# Patient Record
Sex: MALE | Race: WHITE | NOT HISPANIC OR LATINO | Employment: OTHER | URBAN - METROPOLITAN AREA
[De-identification: names, ages, dates, MRNs, and addresses within clinical notes are randomized per-mention and may not be internally consistent; named-entity substitution may affect disease eponyms.]

---

## 2022-05-04 ENCOUNTER — HOSPITAL ENCOUNTER (EMERGENCY)
Facility: CLINIC | Age: 57
Discharge: HOME | End: 2022-05-04
Attending: EMERGENCY MEDICINE | Admitting: EMERGENCY MEDICINE
Payer: MEDICARE

## 2022-05-04 VITALS
SYSTOLIC BLOOD PRESSURE: 113 MMHG | RESPIRATION RATE: 16 BRPM | TEMPERATURE: 98.4 F | OXYGEN SATURATION: 96 % | HEART RATE: 73 BPM | DIASTOLIC BLOOD PRESSURE: 62 MMHG

## 2022-05-04 DIAGNOSIS — S09.92XA INJURY OF NOSE, INITIAL ENCOUNTER: ICD-10-CM

## 2022-05-04 DIAGNOSIS — S09.93XA FACIAL INJURY, INITIAL ENCOUNTER: ICD-10-CM
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
## 2022-05-04 PROCEDURE — 99282 EMERGENCY DEPT VISIT SF MDM: CPT

## 2022-05-04 ASSESSMENT — ENCOUNTER SYMPTOMS
CONSTITUTIONAL NEGATIVE: 1
SEIZURES: 0

## 2022-05-04 NOTE — ED PROVIDER NOTES
History   Chief Complaint:  Facial Injury     HPI   History is provided by the patient's sister, Evelyn.    Ad Joyner is a 56 year old male with history of Down syndrome and Alzheimer's dementia, who presents with fall with facial trauma.  Patient sister reports that patient was at his day program earlier today and was noted on the hallway and tripped and fell.  This happened around 1 PM.  There was no loss of consciousness or seizure-like activity.  Patient has not vomited.  He did have an epistaxis, which is since resolved.  Day program staff noted that his nose appeared crooked, prompting a call to his sister for him to be evaluated.  Per sister, patient is at his cognitive baseline.  He is not on any blood thinners.    Review of Systems   Unable to perform ROS: Dementia   Constitutional: Negative.    Eyes: Negative for visual disturbance.   Neurological: Negative for seizures.       Allergies:  Lactose intolerance    Medications:  Multivitamin with minerals tablet  Zocor  Zyloprim  Indocin  Synthroid  Aricept    Past Medical History:     Down's syndrome  Alzheimer's dementia  High cholesterol  Hypothyroidism  Bilateral impacted cerumen  Regular astigmatism of both eyes  Age-related cataract of both eyes  Folliculitis  Macrocytosis without anemia  Gout  Lactose intolerance  Has no teeth  Hearing loss  OCD  Obesity  Eczema  Severe intellectual disabilities  Pulmonary infiltrates   Trisomy 21  Chicken pox  Measles    Past Surgical History:    Dental exam under anesthesia  Testicle surgery     Family History:    Father: Alzheimer's  Mother: cancer, hyperlipidemia, hypertension  Sister: hyperlipidemia, stroke, TIA    Social History:  The patient presents to the ED with his sister.   The patient goes to a  facility.     Physical Exam     Patient Vitals for the past 24 hrs:   BP Temp Pulse Resp SpO2   05/04/22 1358 113/62 98.4  F (36.9  C) 73 16 96 %       Physical Exam  Vitals: Reviewed, as above.     General: Alert and cooperative, in moderate distress. Resting on bed.  Skin: Warm and well-perfused. No rashes, lesions, or erythema.   HEENT: Head: Normocephalic, no step off or crepitus or hematoma. No Raccoon eyes or Donnelly sign. No facial bone instability. Eyes: Conjunctiva pink, sclera white. EOMs grossly intact. PERRL Ears: Auricles without lesion, erythema, or edema. EACs with cerumen. TMs with no hemotympanum. Nose:  Nose with some edema on the right side and deviation of the nasal bridge. Some dried blood at entrance of left nare with no active bleeding.  Mouth and throat: Lips are moist with no chapping, lesions, or edema, Buccal mucosa is pink and moist without lesions. Tongue with no signs of bite or trauma.  Neck: Supple with no lymphadenopathy. Full ROM.   Pulmonary: Chest wall expansion symmetric with no increased work of breathing. Lungs clear to auscultation bilaterally.   Cardiovascular: Heart RRR with no murmurs, rubs, or gallops. 2+ radial and tibialis posterior pulses bilaterally. No peripheral edema.  Abdominal: Bowel sounds present and physiologic. Abdomen is soft and nontender.  Musculoskeletal: Moves all extremities spontaneously. No midline spinal tenderness.  Psych: Affect appropriate. Patient is quiet and resistant to being evaluated. Occasionally shouts.      Emergency Department Course       Emergency Department Course:         Reviewed:  I reviewed nursing notes, vitals, past medical history and Care Everywhere    Assessments/ Consults:  ED Course as of 05/04/22 1726   Wed May 04, 2022   1657 I obtained history and examined the patient as noted above.          Disposition:  The patient was discharged to home.     Impression & Plan     CMS Diagnoses: None      Medical Decision Making:  Elmer is a 56 year old male with history of Down syndrome and Alzheimer's dementia, who presents with fall with facial trauma.  Please see HPI and physical exam for full details.  Differential diagnosis  included facial bone fracture, clinically important traumatic brain injury, seizure, syncope, among others.  History and physical exam are reassuring and consistent with mechanical fall, and I am not concerned for seizure or syncope.  Patient had epistaxis, which has resolved.  No findings to indicate facial bone fracture.   There is no indication for advanced imaging of facial bones at this time, and according to [place] head CT rule, head CT is not indicated.  I reassured the patient and his sister that he can follow-up with ENT once the swelling has gone down to evaluate for need for closed open reduction.  Supportive care instructions were provided  as well as return precautions.  Sister is comfortable with this plan.  Patient is discharged home in stable condition.       Diagnosis:    ICD-10-CM    1. Fall, initial encounter  W19.XXXA    2. Injury of nose, initial encounter  S09.92XA    3. Facial injury, initial encounter  S09.93XA        Discharge Medications:  New Prescriptions    No medications on file       Scribe Disclosure:  I, Carol Blankenship, am serving as a scribe at 4:45 PM on 5/4/2022 to document services personally performed by Franchesca Gan PA-C based on my observations and the provider's statements to me.      Franchesca Gan PA-C  05/04/22 8363

## 2022-05-04 NOTE — ED PROVIDER NOTES
Emergency Department Attending Supervision Note  5/4/2022  4:47 PM      I evaluated this patient in conjunction with Franchesca Gan PA-C, please see primary note for full details      Briefly, the patient presents to the ED accompanied by sister with a chief complaint of a trip and fall and striking his face with swelling and deformity to his nose.  No loss of consciousness or other injuries.      On my exam, deviation of nasal bridge, dried blood to nares without active bleeding, no other head trauma noted, C-spine nontender, upper extremities nontender, steady gait    MDM    56-year-old male with a history of Alzheimer's and Down's who presents to the emergency department with facial trauma status post mechanical fall.  No indication for emergent imaging.  Doubt intracranial hemorrhage.  Patient's sister/guardian was counseled on expectant management for nasal fractures and follow-up with ENT as needed for reevaluation and further treatment after swelling subsides.  Patient sisters comfortable with this plan.  He was subsequently discharged in stable condition.  Doubt other significant trauma.    Diagnosis    ICD-10-CM    1. Fall, initial encounter  W19.XXXA    2. Injury of nose, initial encounter  S09.92XA    3. Facial injury, initial encounter  S09.93XA          MD Rivas Duggan Christopher E, MD  05/04/22 1719

## 2022-05-04 NOTE — ED TRIAGE NOTES
"Hx down syndrome and alzheimer's. Pt has minimial communication skills at baseline. Pt was at day  program when he fell and hit his nose. Denies LOC or vomiting. Pt had a nose bleed that is resolved. Sister concerned with pt nose being \"Crooked.\" Nose deviates to Left of face; which is new. Denies blood thinner use. Unable to assess pain or if pt can breathe through nose. ABC in tact.      Triage Assessment     Row Name 05/04/22 5814       Triage Assessment (Adult)    Airway WDL WDL       Respiratory WDL    Respiratory WDL WDL       Skin Circulation/Temperature WDL    Skin Circulation/Temperature WDL WDL       Cardiac WDL    Cardiac WDL WDL       Peripheral/Neurovascular WDL    Peripheral Neurovascular WDL WDL       Cognitive/Neuro/Behavioral WDL    Cognitive/Neuro/Behavioral WDL X  baseline     Level of Consciousness confused              "

## 2022-05-09 ENCOUNTER — DOCUMENTATION ONLY (OUTPATIENT)
Dept: OTHER | Facility: CLINIC | Age: 57
End: 2022-05-09
Payer: MEDICARE

## 2023-01-16 ENCOUNTER — HOSPITAL ENCOUNTER (OUTPATIENT)
Facility: CLINIC | Age: 58
Setting detail: OBSERVATION
Discharge: NURSING, REHAB OR LONG-TERM CARE | End: 2023-02-01
Attending: EMERGENCY MEDICINE | Admitting: STUDENT IN AN ORGANIZED HEALTH CARE EDUCATION/TRAINING PROGRAM
Payer: MEDICARE

## 2023-01-16 DIAGNOSIS — Q90.9 DOWN'S SYNDROME: Primary | ICD-10-CM

## 2023-01-16 DIAGNOSIS — M62.81 GENERALIZED MUSCLE WEAKNESS: ICD-10-CM

## 2023-01-16 DIAGNOSIS — R62.7 FAILURE TO THRIVE IN ADULT: ICD-10-CM

## 2023-01-16 LAB
ALBUMIN SERPL BCG-MCNC: 2.9 G/DL (ref 3.5–5.2)
ALP SERPL-CCNC: 86 U/L (ref 40–129)
ALT SERPL W P-5'-P-CCNC: 32 U/L (ref 10–50)
ANION GAP SERPL CALCULATED.3IONS-SCNC: 9 MMOL/L (ref 7–15)
AST SERPL W P-5'-P-CCNC: 28 U/L (ref 10–50)
BASOPHILS # BLD AUTO: 0.1 10E3/UL (ref 0–0.2)
BASOPHILS NFR BLD AUTO: 1 %
BILIRUB SERPL-MCNC: 0.6 MG/DL
BUN SERPL-MCNC: 8.9 MG/DL (ref 6–20)
CALCIUM SERPL-MCNC: 8.9 MG/DL (ref 8.6–10)
CHLORIDE SERPL-SCNC: 102 MMOL/L (ref 98–107)
CREAT SERPL-MCNC: 0.86 MG/DL (ref 0.67–1.17)
DEPRECATED HCO3 PLAS-SCNC: 28 MMOL/L (ref 22–29)
EOSINOPHIL # BLD AUTO: 0.1 10E3/UL (ref 0–0.7)
EOSINOPHIL NFR BLD AUTO: 1 %
ERYTHROCYTE [DISTWIDTH] IN BLOOD BY AUTOMATED COUNT: 15.1 % (ref 10–15)
GFR SERPL CREATININE-BSD FRML MDRD: >90 ML/MIN/1.73M2
GLUCOSE SERPL-MCNC: 81 MG/DL (ref 70–99)
HCT VFR BLD AUTO: 34.6 % (ref 40–53)
HGB BLD-MCNC: 11.4 G/DL (ref 13.3–17.7)
IMM GRANULOCYTES # BLD: 0.1 10E3/UL
IMM GRANULOCYTES NFR BLD: 1 %
LYMPHOCYTES # BLD AUTO: 2.2 10E3/UL (ref 0.8–5.3)
LYMPHOCYTES NFR BLD AUTO: 21 %
MCH RBC QN AUTO: 33.5 PG (ref 26.5–33)
MCHC RBC AUTO-ENTMCNC: 32.9 G/DL (ref 31.5–36.5)
MCV RBC AUTO: 102 FL (ref 78–100)
MONOCYTES # BLD AUTO: 1.4 10E3/UL (ref 0–1.3)
MONOCYTES NFR BLD AUTO: 13 %
NEUTROPHILS # BLD AUTO: 6.7 10E3/UL (ref 1.6–8.3)
NEUTROPHILS NFR BLD AUTO: 63 %
NRBC # BLD AUTO: 0 10E3/UL
NRBC BLD AUTO-RTO: 0 /100
PLATELET # BLD AUTO: 130 10E3/UL (ref 150–450)
POTASSIUM SERPL-SCNC: 3.8 MMOL/L (ref 3.4–5.3)
PROT SERPL-MCNC: 6.9 G/DL (ref 6.4–8.3)
RBC # BLD AUTO: 3.4 10E6/UL (ref 4.4–5.9)
SODIUM SERPL-SCNC: 139 MMOL/L (ref 136–145)
WBC # BLD AUTO: 10.4 10E3/UL (ref 4–11)

## 2023-01-16 PROCEDURE — 36415 COLL VENOUS BLD VENIPUNCTURE: CPT | Performed by: EMERGENCY MEDICINE

## 2023-01-16 PROCEDURE — 84155 ASSAY OF PROTEIN SERUM: CPT | Performed by: EMERGENCY MEDICINE

## 2023-01-16 PROCEDURE — 99285 EMERGENCY DEPT VISIT HI MDM: CPT | Mod: 25

## 2023-01-16 PROCEDURE — 82947 ASSAY GLUCOSE BLOOD QUANT: CPT | Performed by: EMERGENCY MEDICINE

## 2023-01-16 PROCEDURE — G0378 HOSPITAL OBSERVATION PER HR: HCPCS

## 2023-01-16 PROCEDURE — 80053 COMPREHEN METABOLIC PANEL: CPT | Performed by: EMERGENCY MEDICINE

## 2023-01-16 PROCEDURE — 99222 1ST HOSP IP/OBS MODERATE 55: CPT | Performed by: PHYSICIAN ASSISTANT

## 2023-01-16 PROCEDURE — 85025 COMPLETE CBC W/AUTO DIFF WBC: CPT | Performed by: EMERGENCY MEDICINE

## 2023-01-16 RX ORDER — ACETAMINOPHEN 650 MG/1
650 SUPPOSITORY RECTAL EVERY 6 HOURS PRN
Status: DISCONTINUED | OUTPATIENT
Start: 2023-01-16 | End: 2023-02-01 | Stop reason: HOSPADM

## 2023-01-16 RX ORDER — ONDANSETRON 2 MG/ML
4 INJECTION INTRAMUSCULAR; INTRAVENOUS EVERY 6 HOURS PRN
Status: DISCONTINUED | OUTPATIENT
Start: 2023-01-16 | End: 2023-02-01 | Stop reason: HOSPADM

## 2023-01-16 RX ORDER — ONDANSETRON 4 MG/1
4 TABLET, ORALLY DISINTEGRATING ORAL EVERY 6 HOURS PRN
Status: DISCONTINUED | OUTPATIENT
Start: 2023-01-16 | End: 2023-02-01 | Stop reason: HOSPADM

## 2023-01-16 RX ORDER — DONEPEZIL HYDROCHLORIDE 5 MG/1
5 TABLET, FILM COATED ORAL AT BEDTIME
COMMUNITY

## 2023-01-16 RX ORDER — LEVOTHYROXINE SODIUM 25 UG/1
25-50 TABLET ORAL DAILY
COMMUNITY

## 2023-01-16 RX ORDER — DONEPEZIL HYDROCHLORIDE 10 MG/1
20 TABLET, FILM COATED ORAL AT BEDTIME
COMMUNITY

## 2023-01-16 RX ORDER — ACETAMINOPHEN 325 MG/1
650 TABLET ORAL EVERY 6 HOURS PRN
Status: DISCONTINUED | OUTPATIENT
Start: 2023-01-16 | End: 2023-02-01 | Stop reason: HOSPADM

## 2023-01-16 ASSESSMENT — ACTIVITIES OF DAILY LIVING (ADL)
ADLS_ACUITY_SCORE: 39
ADLS_ACUITY_SCORE: 41
ADLS_ACUITY_SCORE: 35
ADLS_ACUITY_SCORE: 35
ADLS_ACUITY_SCORE: 47
ADLS_ACUITY_SCORE: 47
ADLS_ACUITY_SCORE: 39
ADLS_ACUITY_SCORE: 39

## 2023-01-16 NOTE — ED NOTES
[facility]  ED Nurse Handoff Report    Ad Joyner is a 57 year old male   ED Chief complaint: Social Work Services  . ED Diagnosis:   Final diagnoses:   Generalized muscle weakness   Failure to thrive in adult     Allergies: No Known Allergies    Code Status: Full Code  Activity level - Baseline/Home:  Total Care. Activity Level - Current:   Total Care. Lift room needed: Yes. Bariatric: No   Needed: No   Isolation: No. Infection: Not Applicable.     Vital Signs:   Vitals:    01/16/23 0825 01/16/23 0848   BP:  113/61   Pulse: 100 91   Resp: 18 20   Temp: 98.3  F (36.8  C) 98.7  F (37.1  C)   TempSrc: Temporal Axillary   SpO2: 97% 91%       Cardiac Rhythm:  ,      Pain level:    Patient confused: Yes. Patient Falls Risk: Yes.   Elimination Status: Has voided   Patient Report - Initial Complaint: social work services. Focused Assessment: pt generalized weakness, needing placement    Tests Performed: see results. Abnormal Results: see results.  Labs Ordered and Resulted from Time of ED Arrival to Time of ED Departure   COMPREHENSIVE METABOLIC PANEL - Abnormal       Result Value    Sodium 139      Potassium 3.8      Chloride 102      Carbon Dioxide (CO2) 28      Anion Gap 9      Urea Nitrogen 8.9      Creatinine 0.86      Calcium 8.9      Glucose 81      Alkaline Phosphatase 86      AST 28      ALT 32      Protein Total 6.9      Albumin 2.9 (*)     Bilirubin Total 0.6      GFR Estimate >90     CBC WITH PLATELETS AND DIFFERENTIAL - Abnormal    WBC Count 10.4      RBC Count 3.40 (*)     Hemoglobin 11.4 (*)     Hematocrit 34.6 (*)      (*)     MCH 33.5 (*)     MCHC 32.9      RDW 15.1 (*)     Platelet Count 130 (*)     % Neutrophils 63      % Lymphocytes 21      % Monocytes 13      % Eosinophils 1      % Basophils 1      % Immature Granulocytes 1      NRBCs per 100 WBC 0      Absolute Neutrophils 6.7      Absolute Lymphocytes 2.2      Absolute Monocytes 1.4 (*)     Absolute Eosinophils 0.1       Absolute Basophils 0.1      Absolute Immature Granulocytes 0.1      Absolute NRBCs 0.0     ROUTINE UA WITH MICROSCOPIC REFLEX TO CULTURE       Treatments provided: see MAR  Family Comments: family at bedside  OBS brochure/video discussed/provided to patient:  Yes  ED Medications: Medications - No data to display  Drips infusing:  No  For the majority of the shift, the patient's behavior Green. Interventions performed were n/a.    Sepsis treatment initiated: No     Patient tested for COVID 19 prior to admission: NO    ED Nurse Name/Phone Number: Ysabel Blakely RN,   12:11 PM    RECEIVING UNIT ED HANDOFF REVIEW    Above ED Nurse Handoff Report was reviewed: Yes  Reviewed by: Irene Ashley RN on January 16, 2023 at 1:29 PM

## 2023-01-16 NOTE — ED PROVIDER NOTES
History   Chief Complaint:  Social Work Services     The history is provided by a relative. The history is limited by the condition of the patient (Alzheimer's).      Ad Joyner is a 57 year old male who presents for social work services. Patient's sister reports that the patient lives at home with her and uses a wheelchair to move around. She states that the patient had Covid around 12/25/22 and was able to walk independently before. She notes that he is not independently moving around at all. She also notes that the patient's cognitive state is declining, due to his Alzheimer's, and he is not talking the same amount as baseline. No fever or cough. Sister adds that she is looking for the patient to be placed, because she is not able to take care of him anymore. They were told to come into the ED for admission to the hospital for placement. No new falls or injuries.     Independent Historian: Supplemented by sister, who is his guardian    ROS:  Review of Systems   Unable to perform ROS: Dementia     Allergies:  No Known Allergies     Medications:    Simvastatin   Levothyroxine   Indomethacin   Donepezil   Allopurinol   Memantine     Past Medical History:    Down's syndrome  Alzheimer's disease  Pneumonia   Hypothyroidism  Folliculitis   Gout  Lactose intolerance  Hyperlipidemia   OCD  Obese  Measles  Chicken pox    Past Surgical History:    Testicle surgery   Dental surgery     Family History:    Father - Alzheimer's, stroke  Mother - unspecified cancer, hyperlipidemia, hypertension    Social History:  Presents with sister  Lives with sister  PCP: No Ref-Primary, Physician     Physical Exam     Patient Vitals for the past 24 hrs:   BP Temp Temp src Pulse Resp SpO2   01/16/23 0848 113/61 98.7  F (37.1  C) Axillary 91 20 91 %   01/16/23 0825 -- 98.3  F (36.8  C) Temporal 100 18 97 %        Physical Exam  VS: Reviewed per above  HENT: Macroglossia  EYES: sclera anicteric  CV: Rate as noted, regular rhythm.    RESP: Effort normal. Breath sounds are normal bilaterally.  GI: no tenderness/rebound/guarding, not distended.  NEURO: Alert, moving all extremities  MSK: No deformity of the extremities  SKIN: Warm and dry    Emergency Department Course   Laboratory:  Labs Ordered and Resulted from Time of ED Arrival to Time of ED Departure   COMPREHENSIVE METABOLIC PANEL - Abnormal       Result Value    Sodium 139      Potassium 3.8      Chloride 102      Carbon Dioxide (CO2) 28      Anion Gap 9      Urea Nitrogen 8.9      Creatinine 0.86      Calcium 8.9      Glucose 81      Alkaline Phosphatase 86      AST 28      ALT 32      Protein Total 6.9      Albumin 2.9 (*)     Bilirubin Total 0.6      GFR Estimate >90     CBC WITH PLATELETS AND DIFFERENTIAL - Abnormal    WBC Count 10.4      RBC Count 3.40 (*)     Hemoglobin 11.4 (*)     Hematocrit 34.6 (*)      (*)     MCH 33.5 (*)     MCHC 32.9      RDW 15.1 (*)     Platelet Count 130 (*)     % Neutrophils 63      % Lymphocytes 21      % Monocytes 13      % Eosinophils 1      % Basophils 1      % Immature Granulocytes 1      NRBCs per 100 WBC 0      Absolute Neutrophils 6.7      Absolute Lymphocytes 2.2      Absolute Monocytes 1.4 (*)     Absolute Eosinophils 0.1      Absolute Basophils 0.1      Absolute Immature Granulocytes 0.1      Absolute NRBCs 0.0     ROUTINE UA WITH MICROSCOPIC REFLEX TO CULTURE      Emergency Department Course & Assessments:     Consultations/Discussion of Management or Tests:  ED Course as of 01/16/23 1148   Mon Jan 16, 2023   0930 I obtained history and examined the patient.   1142 I spoke with DIONE Worrell, accepting admission for Dr. Garcia, hospitalist.   1148 I rechecked and updated the patient and family.       Social Determinants of Health affecting care:  Lives with sister, who is his guardian    Disposition:  The patient was admitted to the hospital under the care of Dr. Garcia.     Impression & Plan    Medical Decision Making:  Patient presents  with sister for evaluation of failure to thrive, generalized weakness.  Vital signs reassuring.  History and exam limited due to underlying Down syndrome and cognitive delay.  Sister reports that she is having trouble caring for him due to progressive weakness since recent COVID illness.  Basic labs without marked derangement.  Plan for admission for further evaluation and consideration of placement to higher level of care.    Diagnosis:    ICD-10-CM    1. Generalized muscle weakness  M62.81       2. Failure to thrive in adult  R62.7            Scribe Disclosure:  I, Felicia Mcnamara, am serving as a scribe at 9:02 AM on 1/16/2023 to document services personally performed by Bhupendra Barreto MD based on my observations and the provider's statements to me.     1/16/2023   Bhupendra Barreto MD Lindenbaum, Elan, MD  01/16/23 2346

## 2023-01-16 NOTE — ED TRIAGE NOTES
ABCs intact. Pt had Covid around [place]. Pt has been weak post covid, not being able to get around. Pt's family has been using an office chair to get him around the house. Pt's family talked with social service and was told to bring him the ER for placement since they do not have equipment. Pt was seen at [facility] last week and was supposed to be placed but discharged him instead without talking to family.

## 2023-01-16 NOTE — H&P
[facility]    History and Physical - Hospitalist Service       Date of Admission:  1/16/2023    Assessment & Plan Ad Joyner is a 57 year old male with past medical history significant for Down's syndrome with cognitive impairment and memory loss, hypothyroidism, OCD, hearing loss admitted on 1/16/2023 for disposition assistance.     Legal guardians (Evelyn and Mack) have been caring for Mr. Joyner in home for the last 3 years, prior to this resided in a group home. Due to inability to care for the patient in the home, have sought assistance from the ED, hospital. Have been trying to place since end of December as an outpatient but unsuccessful so far.     Need for disposition assistance  - no acute medical issues  - consult sw, unclear benefit of PT assessment due to cognitive issues but may request for disposition planning    Down's Syndrome    Cognitive Impairment with Memory Loss  At baseline on admit - largely non verbal, used to wheelchair transfer up until the last month, requires assistance with all adl's.  Resistant to medical cares such as vital sign monitoring, lab draws, clinic visits etc.   Currently calm. Does have history of behavior disturbances described as combativeness but no issues with this for years per family.  - resume Donepezil  - as he is a social admission with no acute medical issues will not be drawing routine labs, vital sign monitoring etc. unless there is a change in status  - Bedside attendant at RN discretion  - on a regular diet that is easy to chew due to lack of dentition     Macrocytic Anemia  Hgb near baseline. Does have known hemorrhoid but no significant bleeding symptoms.  - no further work up unless bleeding symptoms       Diet:  regular    DVT Prophylaxis: Pneumatic Compression Devices  Robbins Catheter: Not present    Cardiac Monitoring: None    Code Status:   DNR DNI as per discussion with patient's stated healthcare agent,  "Evelyn.    Clinically Significant Risk Factors Present on Admission              # Hypoalbuminemia: Lowest albumin = 2.9 g/dL at 1/16/2023 10:34 AM, will monitor as appropriate                  Disposition Plan      Expected Discharge Date: 01/17/2023                The patient's care was discussed with the RN, ED team, BRANDY - Evelyn.    Kiersten Schofield PA-C  Hospitalist Service  [facility]  Securely message with [employer] (more info)  Text page via [employer] Paging/Directory     ______________________________________________________________________    Chief Complaint   \"social work services\"    Unable to obtain a history from the patient due to mental status    History of Present Illness   Ad Joyner is a 57 year old male who was out to the emergency department by his healthcare agent with concern for social work services.  Family have been trying to get the patient placed into facility and been unsuccessful. He was ill with covid the end of December 2022, not hospitalized. Did recover from infection. Since this time period patient's sister ([facility]CA) has had increased difficulty caring for him in the home.  He did bring him to [facility] where he was in the ED overflow for disposition assistance and did a work-up there that was negative.   He did discharge home with DME supplies.     In follow up with PCP concerns were raised by sister having difficulty caring for Mr. Joyner due to cooperation, lifting. He reportedly had an episode where he was on the toilet for 6 hours and family had difficulty getting him up. Family have both had surgeries this past year and have had physical limitations in caring for Elmer.   He does have a  who had been trying to help get DME such as lift device, hospital bed, wheelchair.   He has been in his usual state of health. Evelyn states that his biggest limitation to mobility is refusal and cooperation with commands. No new falls or injury. Evelyn " reports cognitive decline since the end of December.     In the ED VSS, afebrile.  Basic lab work was performed, BMP WNL. CBC with slight thrombocytopenia, macrocytic anemia with hemoglobin of 11.4.  Admission was requested from hospitalist service for assistance with disposition.     Past Medical History    Past Medical History:   Diagnosis Date     Down's syndrome      High cholesterol        Past Surgical History   No past surgical history on file.    Prior to Admission Medications   Prior to Admission Medications   Prescriptions Last Dose Informant Patient Reported? Taking?   Calcium Carb-Cholecalciferol (CALCIUM 500 +D PO)   Yes No   Sig: Take 1 tablet by mouth daily.   Cholecalciferol (VITAMIN D-3) 5000 UNITS TABS   Yes No   Sig: Take 1 tablet by mouth daily.   HYDROcodone-acetaminophen (NORCO) 5-325 MG per tablet   No No   Sig: Take 1-2 tablets by mouth every 6 hours as needed for moderate to severe pain   Halobetasol-Ammonium Lactate 0.05 & 12 % (CREAM) KIT   Yes No   Sig: Externally apply 1 Application topically as needed   MINERAL OIL   Yes No   Si drops twice a week   allopurinol (ZYLOPRIM) 100 MG tablet   Yes No   Sig: Take 2 tablets by mouth daily.   carbamide peroxide (DEBROX) 6.5 % otic solution   Yes No   Sig: Place 5 drops in ear(s) 2 times daily 1st 7 days of each month   cyanocolbalamin (VITAMIN  B-12) 500 MCG tablet   Yes No   Sig: Take 500 mcg by mouth daily.   gemfibrozil (LOPID) 600 MG tablet   Yes No   Sig: Take 1 tablet by mouth 2 times daily (before meals).   ketoconazole (NIZORAL) 2 % shampoo   No No   Sig: Apply to the affected area and wash off after 5 minutes.   multivitamins with minerals (CERTAVITE) LIQD   Yes No   Sig: Take 15 mLs by mouth three times a week   simvastatin (ZOCOR) 80 MG tablet   Yes No   Sig: Take 1 tablet by mouth daily.      Facility-Administered Medications: None        Physical Exam   Vital Signs: Temp: 98.7  F (37.1  C) Temp src: Axillary BP: 113/61 Pulse: 91    Resp: 20 SpO2: 91 % O2 Device: None (Room air)    Weight: 0 lbs 0 oz    Gen: no acute distress  Neuro: Intermittently follows commands. Largely non verbal. Sometimes answers yes/ no questions. At baseline per family.  Cardio: Rhythm regular no murmur.  Pulmonary: No abnormal work of breathing.  No expiratory wheezing.  Skin: No gross deformities.  No lower extremity edema.    Medical Decision Making       55 MINUTES SPENT BY ME on the date of service doing chart review, history, exam, documentation & further activities per the note.      Data   As above.

## 2023-01-17 PROCEDURE — G0378 HOSPITAL OBSERVATION PER HR: HCPCS

## 2023-01-17 PROCEDURE — 250N000013 HC RX MED GY IP 250 OP 250 PS 637: Performed by: PHYSICIAN ASSISTANT

## 2023-01-17 PROCEDURE — 99231 SBSQ HOSP IP/OBS SF/LOW 25: CPT | Performed by: PHYSICIAN ASSISTANT

## 2023-01-17 RX ORDER — SIMVASTATIN 40 MG
40 TABLET ORAL DAILY
Status: DISCONTINUED | OUTPATIENT
Start: 2023-01-17 | End: 2023-01-18

## 2023-01-17 RX ORDER — LEVOTHYROXINE SODIUM 25 UG/1
50 TABLET ORAL
Status: DISCONTINUED | OUTPATIENT
Start: 2023-01-18 | End: 2023-02-01 | Stop reason: HOSPADM

## 2023-01-17 RX ORDER — UREA 10 %
500 LOTION (ML) TOPICAL DAILY
Status: DISCONTINUED | OUTPATIENT
Start: 2023-01-17 | End: 2023-02-01 | Stop reason: HOSPADM

## 2023-01-17 RX ORDER — ALLOPURINOL 100 MG/1
200 TABLET ORAL DAILY
Status: DISCONTINUED | OUTPATIENT
Start: 2023-01-17 | End: 2023-02-01 | Stop reason: HOSPADM

## 2023-01-17 RX ORDER — LEVOTHYROXINE SODIUM 25 UG/1
25 TABLET ORAL
Status: DISCONTINUED | OUTPATIENT
Start: 2023-01-17 | End: 2023-02-01 | Stop reason: HOSPADM

## 2023-01-17 RX ADMIN — SIMVASTATIN 40 MG: 40 TABLET, FILM COATED ORAL at 19:38

## 2023-01-17 RX ADMIN — DONEPEZIL HYDROCHLORIDE 25 MG: 10 TABLET ORAL at 22:09

## 2023-01-17 RX ADMIN — ALLOPURINOL 200 MG: 100 TABLET ORAL at 10:52

## 2023-01-17 RX ADMIN — LEVOTHYROXINE SODIUM 25 MCG: 0.03 TABLET ORAL at 10:53

## 2023-01-17 RX ADMIN — Medication 1 TABLET: at 10:53

## 2023-01-17 ASSESSMENT — ACTIVITIES OF DAILY LIVING (ADL)
ADLS_ACUITY_SCORE: 47

## 2023-01-17 NOTE — PLAN OF CARE
PRIMARY DIAGNOSIS: PLACEMENT   OUTPATIENT/OBSERVATION GOALS TO BE MET BEFORE DISCHARGE:  ADLs back to baseline: No    Activity and level of assistance: Total care    Pain status: not showing not verbal signs of pain    Return to near baseline physical activity: No     Discharge Planner Nurse   Safe discharge environment identified: Yes  Barriers to discharge: Yes       Entered by: Irene Ashley RN 01/17/2023      Please review provider order for any additional goals.   Nurse to notify provider when observation goals have been met and patient is ready for discharge.

## 2023-01-17 NOTE — PLAN OF CARE
PRIMARY DIAGNOSIS: GENERALIZED WEAKNESS/FAILURE TO THRIVE    OUTPATIENT/OBSERVATION GOALS TO BE MET BEFORE DISCHARGE  1. Orthostatic performed: N/A    2. Tolerating PO medications: YES    3. Return to near baseline physical activity: No    4. Cleared for discharge by consultants (if involved): No    Vitals are Temp: 99.7  F (37.6  C) Temp src: Axillary BP: 95/65 Pulse: 90   Resp: 18 SpO2: 90 %.  Patient is Disorientated x4.  Unable to make needs known. Pt needs total care. Pt is a 2 person assist with Lift.  Pt is on mechanical soft diet.  Pt is not showing any nonverbal signs of being in pain.  Patient has no IV access. Family at the bedside. Will cont to monitor.    Discharge Planner Nurse   Safe discharge environment identified: No  Barriers to discharge: Yes       Entered by: Irene Ashley RN 01/16/2023      Please review provider order for any additional goals.   Nurse to notify provider when observation goals have been met and patient is ready for discharge.

## 2023-01-17 NOTE — PLAN OF CARE
"PRIMARY DIAGNOSIS: Placement  OUTPATIENT/OBSERVATION GOALS TO BE MET BEFORE DISCHARGE:  1. ADLs back to baseline: No    2. Activity and level of assistance: x2 and lift currently    3. Pain status: Pt occasionally moans, groans, and say \"owie\". Per family, this is baseline and pt is not in pain. Pt also does this when needing to use the restroom.    4. Return to near baseline physical activity: x2 and lift since COVID-19 infection in December per family.     Discharge Planner Nurse   Safe discharge environment identified: No  Barriers to discharge: Yes, placement       Entered by: Jhonny Alfredo 01/17/2023 1:59 AM     Vitals are Temp: 98  F (36.7  C) Temp src: Tympanic BP: 96/60 Pulse: 101   Resp: 16 SpO2: 92 %.  Pt is alert, nonverbal. Assist of 2 and lift to BSC. On a mechanical soft diet. Preferred food items on whiteboard in pt's room. Takes meds crushed in apple sauce. Will need assistance with feeding. Pt has no IV access, ok per provider as pt is here for placement. Family at bedside. SW/CC consulted.          Please review provider order for any additional goals.   Nurse to notify provider when observation goals have been met and patient is ready for discharge.    "

## 2023-01-17 NOTE — PHARMACY-ADMISSION MEDICATION HISTORY
Admission medication history interview status for this patient is complete. See [facility] admission navigator for allergy information, prior to admission medications and immunization status.     Medication history interview done, indicate source(s): Ap Rivas over the phone  Medication history resources (including written lists, pill bottles, clinic record):[place]  Pharmacy: [facility] in [facility]    Changes made to PTA medication list:  Added: Levothyroxine, donepezil  Changed: Simvastatin to 40mg from 80mg  Reported as Not Taking: None  Removed: Norco, ketoconazole, gemfibrozil    Actions taken by pharmacist (provider contacted, etc):None     Additional medication history information:None    Medication reconciliation/reorder completed by provider prior to medication history?  N   (Y/N)       Prior to Admission medications    Medication Sig Last Dose Taking? Auth Provider Long Term End Date   allopurinol (ZYLOPRIM) 100 MG tablet Take 2 tablets by mouth daily. 1/16/2023 at AM Yes Reported, Patient     Calcium Carb-Cholecalciferol (CALCIUM 500 +D PO) Take 1 tablet by mouth daily. 1/15/2023 at PM Yes Reported, Patient     carbamide peroxide (DEBROX) 6.5 % otic solution Place 5 drops in ear(s) 2 times daily 1st 7 days of each month 1/15/2023 at PM Yes Reported, Patient     Cholecalciferol (VITAMIN D-3) 5000 UNITS TABS Take 1 tablet by mouth 2 times daily 1/16/2023 at AM Yes Reported, Patient     cyanocolbalamin (VITAMIN  B-12) 500 MCG tablet Take 500 mcg by mouth daily. 1/16/2023 at AM Yes Reported, Patient     donepezil (ARICEPT) 10 MG tablet Take 20 mg by mouth At Bedtime Take with 5mg tablet for a total of 25mg daily 1/15/2023 at PM Yes Unknown, Entered By History     donepezil (ARICEPT) 5 MG tablet Take 5 mg by mouth At Bedtime Take with two 10mg tablets for a total of 25mg daily 1/15/2023 at PM Yes Unknown, Entered By History     Halobetasol-Ammonium Lactate 0.05 & 12 % (CREAM) KIT Externally apply 1 Application  topically as needed Past Month Yes Reported, Patient     levothyroxine (SYNTHROID/LEVOTHROID) 25 MCG tablet Take 25-50 mcg by mouth daily Take two 25mcg tablets on Saturday Sunday and Wednesday. Take one 25mcg tablet all other days 1/16/2023 at AM Yes Unknown, Entered By History Yes    multivitamins with minerals (CERTAVITE) LIQD Take 15 mLs by mouth three times a week 1/16/2023 at AM Yes Reported, Patient     simvastatin (ZOCOR) 80 MG tablet Take 40 mg by mouth daily 1/15/2023 at PM Yes Reported, Patient Yes    MINERAL OIL 4 drops twice a week   Reported, Patient

## 2023-01-17 NOTE — PLAN OF CARE
"PRIMARY DIAGNOSIS: Placement  OUTPATIENT/OBSERVATION GOALS TO BE MET BEFORE DISCHARGE:  1. ADLs back to baseline: No    2. Activity and level of assistance: x2 and lift currently    3. Pain status: Pt occasionally moans, groans, and say \"owie\". Per family, this is baseline and pt is not in pain. Pt also does this when needing to use the restroom.    4. Return to near baseline physical activity: x2 since COVID-19 infection in December per family.     Discharge Planner Nurse   Safe discharge environment identified: No  Barriers to discharge: Yes, placement       Entered by: Jhonny Alfredo 01/17/2023 5:14 AM     Vitals are Temp: 98  F (36.7  C) Temp src: Tympanic BP: 96/60 Pulse: 101   Resp: 16 SpO2: 92 %.  Pt is alert, nonverbal. Assist of 2 and lift to BSC. On a mechanical soft diet. Preferred food items on whiteboard in pt's room. Takes meds crushed in apple sauce. Will need assistance with feeding. Pt has no IV access, ok per provider as pt is here for placement. SW/CC consulted.          Please review provider order for any additional goals.   Nurse to notify provider when observation goals have been met and patient is ready for discharge.    "

## 2023-01-17 NOTE — PLAN OF CARE
"PRIMARY DIAGNOSIS: Placement  OUTPATIENT/OBSERVATION GOALS TO BE MET BEFORE DISCHARGE:  1. ADLs back to baseline: No    2. Activity and level of assistance: x2 and lift currently    3. Pain status: Pt occasionally moans, groans, and say \"owie\". Per family, this is baseline and pt is not in pain. Pt also does this when needing to use the restroom.    4. Return to near baseline physical activity: x2 and lift since COVID-19 infection in December per family.     Discharge Planner Nurse   Safe discharge environment identified: No  Barriers to discharge: Yes, placement       Entered by: Jhonny Alfredo 01/17/2023 2:10 AM     Vitals are Temp: 98  F (36.7  C) Temp src: Tympanic BP: 96/60 Pulse: 101   Resp: 16 SpO2: 92 %.  Pt is alert, nonverbal. Assist of 2 and lift to BSC. On a mechanical soft diet. Preferred food items on whiteboard in pt's room. Takes meds crushed in apple sauce. Will need assistance with feeding. Pt has no IV access, ok per provider as pt is here for placement. SW/CC consulted.          Please review provider order for any additional goals.   Nurse to notify provider when observation goals have been met and patient is ready for discharge.    "

## 2023-01-17 NOTE — PROGRESS NOTES
[facility]    Medicine Progress Note - Hospitalist Service    Date of Admission:  1/16/2023    Assessment & Plan Ad Joyner is a 57 year old male with past medical history significant for Down's syndrome with cognitive impairment and memory loss, hypothyroidism, OCD, hearing loss admitted on 1/16/2023 for disposition assistance.     Legal guardians (Evelyn and Mack) have been caring for Mr. Joyner in home for the last 3 years, prior to this resided in a group home. Due to inability to care for the patient in the home, have sought assistance from the ED, hospital. Have been trying to place since end of December as an outpatient but unsuccessful so far.     Need for disposition assistance  - no acute medical issues  - consult sw, unclear benefit of PT assessment due to cognitive issues but may request for disposition planning    Down's Syndrome    Cognitive Impairment with Memory Loss, Functional decline in status  At baseline on admit - largely non verbal, used to wheelchair transfer up until the last month, requires assistance with all adl's.  Resistant to medical cares such as vital sign monitoring, lab draws, clinic visits etc.   Currently calm. Does have history of behavior disturbances described as combativeness but no issues with this for years per family.  Cognitive, functional decline since December, 2022.   - resume Donepezil  - as he is a social admission with no acute medical issues will not be drawing routine labs, vital sign monitoring etc. unless there is a change in status  - Bedside attendant at RN discretion  - on a regular diet that is easy to chew due to lack of dentition   - did discuss goals of care with co - guardian today, Evelyn. Restorative, supportive only.     Macrocytic Anemia  Hgb near baseline. Does have known hemorrhoid but no significant bleeding symptoms.  - no further work up unless bleeding symptoms         Diet: Mechanical/Dental Soft Diet    DVT  Prophylaxis: Pneumatic Compression Devices  Robbins Catheter: Not present  Lines: None     Cardiac Monitoring: None  Code Status: No CPR- Pre-arrest intubation OK      Clinically Significant Risk Factors Present on Admission              # Hypoalbuminemia: Lowest albumin = 2.9 g/dL at 1/16/2023 10:34 AM, will monitor as appropriate                  Disposition Plan      Expected Discharge Date: 01/31/2023    Discharge Delays: Placement - LTC  *Medically Ready for Discharge            The patient's care was discussed with the RN.  Kiersten Schofield PA-C  Hospitalist Service  [facility]  Securely message with [employer] (more info)  Text page via [facility] Paging/Directory   ______________________________________________________________________    Interval History   Intermittent refusal of medications, cares. Did eat lunch. No new symptoms to report.  Evelyn is at bedside today and we did discuss goals of care.  I recommended consideration for palliative care and hospice at hospital dismissal if functional status continues to decline.  Evelyn expresses understanding regarding these recommendations.  She does state that goals for Elmer are only supportive.  We discussed that if he were to stop eating here in the hospital that we would revisit this plan as she would not want him to have a feeding tube placed or anything invasive.    Physical Exam   Vital Signs: Temp: 98  F (36.7  C) Temp src: Tympanic BP: 96/60 Pulse: 101   Resp: 16 SpO2: 92 % O2 Device: None (Room air)    Weight: 0 lbs 0 oz    General: no acute distress    Medical Decision Making       25 MINUTES SPENT BY ME on the date of service doing chart review, history, exam, documentation & further activities per the note.      Data   ------------------------- PAST 24 HR DATA REVIEWED -----------------------------------------------

## 2023-01-17 NOTE — UTILIZATION REVIEW
Concurrent stay review; Secondary Review Determination    [facility]        Under the authority of the Utilization Management Committee, the utilization review process indicated a secondary review on the above patient.  The review outcome is based on review of the medical records, discussions with staff, and applying clinical experience noted on the date of the review.        (x) Observation/outpatient Status Appropriate - Concurrent stay review- [facility]IAL       RATIONALE FOR DETERMINATION:   57 year old male with past medical history significant for Down's syndrome with cognitive impairment and memory loss, hypothyroidism, OCD, hearing loss admitted on 1/16/2023 for disposition assistance.      Legal guardians (Evelyn and Mack) have been caring for Mr. Joyner in home for the last 3 years, prior to this resided in a group home. Due to inability to care for the patient in the home, have sought assistance from the ED, hospital. Have been trying to place since end of December as an outpatient but unsuccessful so far.     Patient delayed discharge is related to disposition, there is no medical necessity for inpatient admission at the time of this review. If there is a change in patient status, please resend for review.    The information on this document is developed by the utilization review team in order for the business office to ensure compliance.  This only denotes the appropriateness of proper admission status and does not reflect the quality of care rendered.       The definitions of Inpatient Status and Observation Status used in making the determination above are those provided in the CMS Coverage Manual, Chapter 1 and Chapter 6, section 70.4.       Sincerely,    Mitch Dobbins MD

## 2023-01-17 NOTE — CONSULTS
Care Management Initial Consult    General Information  Assessment completed with: Family, Guardian Evelyn  Type of CM/SW Visit: Offer D/C Planning    Primary Care Provider verified and updated as needed: Yes   Readmission within the last 30 days:     Reason for Consult: discharge planning  Advance Care Planning: Advance Care Planning Reviewed: present on chart        Communication Assessment  Patient's communication style: spoken language (English or Bilingual)    Hearing Difficulty or Deaf: no     Cognitive  Cognitive/Neuro/Behavioral: .WDL except, all  Level of Consciousness: alert  Arousal Level: opens eyes spontaneously  Orientation: disoriented x 4  Mood/Behavior: calm  Best Language: 0 - No aphasia  Speech: JONO (unable to assess)    Living Environment:   People in home: sibling(s)     Current living Arrangements: house      Able to return to prior arrangements: yes    Family/Social Support:  Care provided by:  (guardian)  Provides care for: no one, unable/limited ability to care for self  Marital Status: Single  Guardian          Description of Support System: Supportive, Involved    Support Assessment: Lacks adequate physical care    Current Resources:   Patient receiving home care services:  No  Equipment currently used at home:  WC, hospital bed    Employment/Financial:  Employment Status: disabled     Financial Concerns: insurance, none   Referral to Financial Worker: No     Lifestyle & Psychosocial Needs:  Social Determinants of Health     Tobacco Use: Low Risk      Smoking Tobacco Use: Never     Smokeless Tobacco Use: Never     Passive Exposure: Not on file   Alcohol Use: Not on file   Financial Resource Strain: Not on file   Food Insecurity: Not on file   Transportation Needs: Not on file   Physical Activity: Not on file   Stress: Not on file   Social Connections: Not on file   Intimate Partner Violence: Not on file   Depression: Not on file   Housing Stability: Not on file     Functional  Status:  Prior to admission patient needed assistance: Patient was admitted under observation status for weakness, failure to thrive.     KELLI met with patient and guarditommy Rivas at bedside. She reports that she was caring for patient in her home for the past few years but is unable to do so any longer d/t equipment needs and patient's increasing acuity. Guardian and LINDY are working on finding group home placement for patient. Guardian has toured facilities and CM has sent referrals prior to admission.     KELLI spoke with patient's DD LINDY Linder, [phone]. She reports that she has sent referrals to [facility], [facility], and [facility]. Referrals are pending at this time. She is hopeful that they will be able to obtain  funding within 2-4 weeks.     Bessie Rivas is agreeable to LTC/SNF referrals being sent for placement from hospital prior to moving in to  as patient is medically stable and does not need a hospital level of care. She lives in [place] and would prefer [facility].     Reviewed MOON letter with guardian.     Mental Health Status:  Mental Health Status: No Current Concerns       Chemical Dependency Status:  Chemical Dependency Status: No Current Concerns           Values/Beliefs:  Spiritual, Cultural Beliefs, Mu-ism Practices, Values that affect care: yes             Additional Information:  Plan: LTC referrals sent. LINDY and guardian working on obtaining  placement for patient. KELLI will continue to follow.     LUIS M Rea, [facility]   Inpatient Care Coordination  [facility]   [phone]

## 2023-01-17 NOTE — PLAN OF CARE
PRIMARY DIAGNOSIS: PLACEMENT   OUTPATIENT/OBSERVATION GOALS TO BE MET BEFORE DISCHARGE:  1. ADLs back to baseline: No    2. Activity and level of assistance: Total care    3. Pain status: not showing not verbal signs of pain    4. Return to near baseline physical activity: No     Vitals are Temp: 98  F (36.7  C) Temp src: Tympanic BP: 96/60 Pulse: 101   Resp: 16 SpO2: 92 %.  Patient is Disorientated x4. Pt is a total care.  Pt is a mechanical soft diet.  pt is not showing any nonverbal signs of being in pain.  Patient has no IV access.Family at bedside and helping. Will cont to monitor.     Discharge Planner Nurse   Safe discharge environment identified: Yes  Barriers to discharge: Yes       Entered by: Irene Ashley RN 01/17/2023      Please review provider order for any additional goals.   Nurse to notify provider when observation goals have been met and patient is ready for discharge.

## 2023-01-18 ENCOUNTER — APPOINTMENT (OUTPATIENT)
Dept: PHYSICAL THERAPY | Facility: CLINIC | Age: 58
End: 2023-01-18
Attending: PHYSICIAN ASSISTANT
Payer: MEDICARE

## 2023-01-18 PROCEDURE — G0378 HOSPITAL OBSERVATION PER HR: HCPCS

## 2023-01-18 PROCEDURE — 97161 PT EVAL LOW COMPLEX 20 MIN: CPT | Mod: GP

## 2023-01-18 PROCEDURE — 250N000013 HC RX MED GY IP 250 OP 250 PS 637: Performed by: PHYSICIAN ASSISTANT

## 2023-01-18 PROCEDURE — 99231 SBSQ HOSP IP/OBS SF/LOW 25: CPT | Performed by: PHYSICIAN ASSISTANT

## 2023-01-18 RX ORDER — SIMVASTATIN 40 MG
40 TABLET ORAL
Status: DISCONTINUED | OUTPATIENT
Start: 2023-01-18 | End: 2023-02-01 | Stop reason: HOSPADM

## 2023-01-18 RX ADMIN — LEVOTHYROXINE SODIUM 50 MCG: 0.03 TABLET ORAL at 12:01

## 2023-01-18 RX ADMIN — CYANOCOBALAMIN TAB 500 MCG 500 MCG: 500 TAB at 12:00

## 2023-01-18 RX ADMIN — SIMVASTATIN 40 MG: 40 TABLET, FILM COATED ORAL at 18:25

## 2023-01-18 RX ADMIN — DONEPEZIL HYDROCHLORIDE 25 MG: 10 TABLET ORAL at 18:25

## 2023-01-18 RX ADMIN — ALLOPURINOL 200 MG: 100 TABLET ORAL at 11:59

## 2023-01-18 RX ADMIN — Medication 1 TABLET: at 11:59

## 2023-01-18 ASSESSMENT — ACTIVITIES OF DAILY LIVING (ADL)
ADLS_ACUITY_SCORE: 47
ADLS_ACUITY_SCORE: 51
ADLS_ACUITY_SCORE: 47

## 2023-01-18 NOTE — PLAN OF CARE
PRIMARY DIAGNOSIS: Placement  OUTPATIENT/OBSERVATION GOALS TO BE MET BEFORE DISCHARGE:  1. ADLs back to baseline: No    2. Activity and level of assistance: Ax2 and lift    3. Pain status: no pain visualized    4. Return to near baseline physical activity: No     Discharge Planner Nurse   Safe discharge environment identified: No  Barriers to discharge: Yes, placement       Entered by: Jhonny Alfredo 01/18/2023 1:18 AM     Vitals are Temp: 99.3  F (37.4  C) Temp src: Axillary BP: 97/64 Pulse: 100   Resp: 16 SpO2: 95 %.    Pt is alert, nonverbal. Ax2 and total care. On a mechanical soft diet. Awaiting placement. CC/SW following.           Please review provider order for any additional goals.   Nurse to notify provider when observation goals have been met and patient is ready for discharge.

## 2023-01-18 NOTE — PROGRESS NOTES
[facility]  OUTPATIENT PHYSICAL THERAPY EVALUATION  PLAN OF TREATMENT FOR OUTPATIENT REHABILITATION  (COMPLETE FOR INITIAL CLAIMS ONLY)  Patient's Last Name, First Name, M.I.  YOB: 1965  Ad Joyner                        Provider's Name  [facility] Medical Record No.  [id number]                             Onset Date:  01/16/23   Start of Care Date:  01/18/23   Type:     _X_PT   ___OT   ___SLP Medical Diagnosis:  down's syndrome              PT Diagnosis:  impaired functional mobility Visits from SOC:  1     See note for plan of treatment, functional goals and certification details    I CERTIFY THE NEED FOR THESE SERVICES FURNISHED UNDER        THIS PLAN OF TREATMENT AND WHILE UNDER MY CARE     (Physician co-signature of this document indicates review and certification of the therapy plan).                 01/18/23 1500   Appointment Info   Signing Clinician's Name / Credentials (PT) Ramila Storm DPT   Quick Adds   Quick Adds Certification   Living Environment   People in Home sibling(s)   Current Living Arrangements house   Home Accessibility stairs to enter home;stairs within home   Number of Stairs, Main Entrance 2   Stair Railings, Main Entrance railings safe and in good condition   Number of Stairs, Within Home, Primary eight   Stair Railings, Within Home, Primary railings safe and in good condition   Living Environment Comments Had not been able to complete stairs recently, needing stretcher transport/stair chair out of home.   Self-Care   Usual Activity Tolerance fair   Current Activity Tolerance poor   Equipment Currently Used at Home walker, rolling;wheelchair, manual;hospital bed   Fall history within last six months no   Activity/Exercise/Self-Care Comment Pt had been transferring to/from  with assist of 1, sister assists with all ADLs/IADLs. Per sister, had recently been able to walk a short distance with no AD.  "  General Information   Onset of Illness/Injury or Date of Surgery 01/16/23   Referring Physician Kiersten Schofield PA-C   Pertinent History of Current Problem (include personal factors and/or comorbidities that impact the POC) \"57 year old male with past medical history significant for Down's syndrome with cognitive impairment and memory loss, hypothyroidism, OCD, hearing loss admitted on 1/16/2023 for disposition assistance.      Legal guardians (Evelyn and Mack) have been caring for Mr. Joyner in home for the last 3 years, prior to this resided in a group home. Due to inability to care for the patient in the home, have sought assistance from the ED, hospital. Have been trying to place since end of December as an outpatient but unsuccessful so far.\"   Existing Precautions/Restrictions fall   General Observations Pt up in WC with sister present, agreeable to eval.   Cognition   Affect/Mental Status (Cognition) unable/difficult to assess   Orientation Status (Cognition) unable/difficult to assess   Follows Commands (Cognition) follows one-step commands;0-24% accuracy   Cognitive Status Comments Pt is non-verbal, does appear to follow commands at times.   Pain Assessment   Patient Currently in Pain No   Integumentary/Edema   Integumentary/Edema no deficits were identifed   Posture    Posture Forward head position;Protracted shoulders   Range of Motion (ROM)   Range of Motion ROM is WFL   Strength (Manual Muscle Testing)   Strength (Manual Muscle Testing) Deficits observed during functional mobility   Strength Comments Decreased LE functional strength   Bed Mobility   Comment, (Bed Mobility) NT, pt up in WC upon PT arrival   Transfers   Transfers sit-stand transfer   Comment, (Transfers) Initiated with FWW, pt groaning and resisting. Next attempted with HHA, pt continues to resist transfer trial   Gait/Stairs (Locomotion)   Comment, (Gait/Stairs) Unable to test d/t command following and participation in eval "   Balance   Balance other (describe)   Balance Comments Fair sitting balance in WC   Clinical Impression   Criteria for Skilled Therapeutic Intervention Yes, treatment indicated   PT Diagnosis (PT) impaired functional mobility   Influenced by the following impairments impaired strength, balance, cognition   Functional limitations due to impairments impaired bed mobility, transfers, ambulation   Clinical Presentation (PT Evaluation Complexity) Stable/Uncomplicated   Clinical Presentation Rationale Based on current presentation, PMH, social support   Clinical Decision Making (Complexity) low complexity   Planned Therapy Interventions (PT) balance training;bed mobility training;gait training;home exercise program;patient/family education;ROM (range of motion);stair training;strengthening;transfer training;home program guidelines;progressive activity/exercise   Risk & Benefits of therapy have been explained evaluation/treatment results reviewed;care plan/treatment goals reviewed;risks/benefits reviewed;current/potential barriers reviewed;participants voiced agreement with care plan;participants included;patient   PT Total Evaluation Time   PT Eval, Low Complexity Minutes (11074) 20   Therapy Certification   Start of care date 01/18/23   Certification date from 01/18/23   Certification date to 01/25/23   Medical Diagnosis down's syndrome   Physical Therapy Goals   PT Frequency 2x/week   PT Predicted Duration/Target Date for Goal Attainment 01/25/23   PT Goals Bed Mobility;Transfers;Gait   PT: Bed Mobility Minimal assist;Supine to/from sit   PT: Transfers Minimal assist;Sit to/from stand;Assistive device   PT: Gait Minimal assist;Assistive device;Rolling walker;5 feet   PT Discharge Planning   PT Plan attempt STS and bed mobility as able, sign off if does not participate   PT Discharge Recommendation (DC Rec) Long term care facility   PT Rationale for DC Rec Pt with limited participation in evaluation 2/2 cognitive  impairment. Per sister, able to transfer with Ax1 at baseline but family has been having difficulty providing appropriate level of assist. As pt does not participate in skilled therapy this date, would recommend LTC facility for increased level of assist. If returning home, would recommend use of sandrita for decreased caregiver burden.   PT Brief overview of current status poor participation in eval, refuses to stand   Total Session Time   Total Session Time (sum of timed and untimed services) 20

## 2023-01-18 NOTE — PLAN OF CARE
PRIMARY DIAGNOSIS: Placement  OUTPATIENT/OBSERVATION GOALS TO BE MET BEFORE DISCHARGE:  1. ADLs back to baseline: No    2. Activity and level of assistance: Ax2 and lift    3. Pain status: no pain visualized    4. Return to near baseline physical activity: No     Discharge Planner Nurse   Safe discharge environment identified: No  Barriers to discharge: Yes, placement       Entered by: Jhonny Alfredo 01/18/2023 1:12 AM     Vitals are Temp: 99.3  F (37.4  C) Temp src: Axillary BP: 97/64 Pulse: 100   Resp: 16 SpO2: 95 %.    Pt is alert, nonverbal. Ax2 and total care. On a mechanical soft diet. Visiting with family in room this evening. Awaiting placement. CC/SW following.           Please review provider order for any additional goals.   Nurse to notify provider when observation goals have been met and patient is ready for discharge.

## 2023-01-18 NOTE — PLAN OF CARE
"PRIMARY DIAGNOSIS: Failure to Thrive; Placement  OUTPATIENT/OBSERVATION GOALS TO BE MET BEFORE DISCHARGE:  1. ADLs back to baseline: No    Activity and level of assistance: Ax2; Lift; Total care    2. Pain status: No pain visualized    3. Return to near baseline physical activity: No     Discharge Planner Nurse   Safe discharge environment identified: No  Barriers to discharge: Yes       Entered by: María Elena Ackerman RN 01/18/2023    Pt is sleeping in bed, RR: 16. Pt is not allowing for writer to do VS or radial pulse check. Pushed hand away and says \"No\". Pt has tongue out. Is total cares; Lift. No IV Access. SW following    Please review provider order for any additional goals.   Nurse to notify provider when observation goals have been met and patient is ready for discharge.    "

## 2023-01-18 NOTE — PROGRESS NOTES
[facility]    Medicine Progress Note - Hospitalist Service    Date of Admission:  1/16/2023    Assessment & Plan Ad Joyner is a 57 year old male with past medical history significant for Down's syndrome with cognitive impairment and memory loss, hypothyroidism, OCD, hearing loss admitted on 1/16/2023 for disposition assistance.      Legal guardians (Evelyn and Mack) have been caring for Mr. Joyner in home for the last 3 years, prior to this resided in a group home. Due to inability to care for the patient in the home, have sought assistance from the ED, hospital. Have been trying to place since end of December as an outpatient but unsuccessful so far.      Need for disposition assistance  - no acute medical issues  - consult sw, unclear benefit of PT assessment due to cognitive issues but may request for disposition planning     Down's Syndrome    Cognitive Impairment with Memory Loss, Functional decline in status  At baseline on admit - largely non verbal, used to wheelchair transfer up until the last month, requires assistance with all adl's.  Resistant to medical cares such as vital sign monitoring, lab draws, clinic visits etc.   Currently calm. Does have history of behavior disturbances described as combativeness but no issues with this for years per family.  Cognitive, functional decline since December, 2022.   - resume Donepezil  - as he is a social admission with no acute medical issues will not be drawing routine labs, vital sign monitoring etc. unless there is a change in status  - Bedside attendant at RN discretion  - on a regular diet that is easy to chew due to lack of dentition, needs assistance with all meals and fluid intake  - Family is ok to continue to help with cares ie med administration, meals   - did discuss goals of care with co - guardian, Evelyn. Restorative, supportive only. I did place a hospice referral for at dismissal and Evelyn is aware of this. He  has had significant functional decline. As he is still eating, no acute medical issues, planning to hold off on formal hospice enrollment at this time.      Macrocytic Anemia  Hgb near baseline. Does have known hemorrhoid but no significant bleeding symptoms.  - no further work up unless bleeding symptoms     Diet: Mechanical/Dental Soft Diet    DVT Prophylaxis: Pneumatic Compression Devices  Robbins Catheter: Not present  Lines: None     Cardiac Monitoring: None  Code Status: No CPR- Pre-arrest intubation OK      Clinically Significant Risk Factors Present on Admission              # Hypoalbuminemia: Lowest albumin = 2.9 g/dL at 1/16/2023 10:34 AM, will monitor as appropriate                  Disposition Plan      Expected Discharge Date: 01/27/2023    Discharge Delays: Placement - LTC  *Medically Ready for Discharge            The patient's care was discussed with the RN, PT, SW.     Kiersten Schofield PA-C  Hospitalist Service  [facility]  Securely message with [employer] (more info)  Text page via [employer] Paging/Directory   ______________________________________________________________________    Interval History   Intermittent refusal of cares.  Patient's sister has been able to get him to eat, take medications.  They are requesting Preparation H for hemorrhoids.  They are he has had no nonverbal demonstrations of pain.  No vomiting.  Up in sling to wheelchair today.    Physical Exam   Vital Signs: Temp: 97.9  F (36.6  C) Temp src: Axillary BP: 97/57 Pulse: 92   Resp: 16 SpO2: 91 % O2 Device: None (Room air)    Weight: 0 lbs 0 oz  General: No apparent distress.  Appears comfortable.  Respiratory: Normal work of breathing  Neurologic: Largely nonverbal.  Alert.  Uses smiling or yes no to communicate.  Moving all extremities spontaneously.  Able to feed himself with encouragement.    Medical Decision Making       35 MINUTES SPENT BY ME on the date of service doing chart review, history, exam,  documentation & further activities per the note.      Data   ------------------------- PAST 24 HR DATA REVIEWED -----------------------------------------------

## 2023-01-18 NOTE — PLAN OF CARE
PRIMARY DIAGNOSIS: Placement  OUTPATIENT/OBSERVATION GOALS TO BE MET BEFORE DISCHARGE:  1. ADLs back to baseline: No    2. Activity and level of assistance: Ax2 and lift    3. Pain status: no pain visualized    4. Return to near baseline physical activity: No     Discharge Planner Nurse   Safe discharge environment identified: No  Barriers to discharge: Yes, placement       Entered by: Jhonny Alfredo 01/18/2023 4:55 AM     Vitals are Temp: 99.3  F (37.4  C) Temp src: Axillary BP: 97/64 Pulse: 100   Resp: 16 SpO2: 95 %.    Pt is alert, nonverbal. Ax2 and total care. Up to BSC x1. On a mechanical soft diet. Bed alarm on for safety. Awaiting placement. CC/SW following.            Please review provider order for any additional goals.   Nurse to notify provider when observation goals have been met and patient is ready for discharge.

## 2023-01-18 NOTE — PLAN OF CARE
PRIMARY DIAGNOSIS: Failure to Thrive; Placement  OUTPATIENT/OBSERVATION GOALS TO BE MET BEFORE DISCHARGE:  1. ADLs back to baseline: No    Activity and level of assistance: Ax2; Lift; Total care    2. Pain status: No pain visualized    3. Return to near baseline physical activity: No     Discharge Planner Nurse   Safe discharge environment identified: No  Barriers to discharge: Yes       Entered by: María Elena Ackerman RN 01/18/2023    Pt is awake now that family is present. Able to pull morning medication as pt's guardian is able to give medication and pt will take with applesauce. Lifted Ax2 to pt's wheelchair with chair alarm in place. Pt's wheelchair also has a seat belt. Tolerated lunch. Had a soft BM, hemorrhoid seen on rectum. PT consulted.       Please review provider order for any additional goals. Nurse to notify provider when observation goals have been met and patient is ready for discharge.

## 2023-01-19 PROCEDURE — G0378 HOSPITAL OBSERVATION PER HR: HCPCS

## 2023-01-19 PROCEDURE — 99207 PR NO CHARGE LOS: CPT | Performed by: PHYSICIAN ASSISTANT

## 2023-01-19 PROCEDURE — 250N000013 HC RX MED GY IP 250 OP 250 PS 637: Performed by: PHYSICIAN ASSISTANT

## 2023-01-19 RX ADMIN — ALLOPURINOL 200 MG: 100 TABLET ORAL at 12:12

## 2023-01-19 RX ADMIN — GLYCERIN, PETROLATUM, PHENYLEPHRINE HCL, PRAMOXINE HCL: 144; 2.5; 10; 15 CREAM TOPICAL at 20:57

## 2023-01-19 RX ADMIN — Medication 1 TABLET: at 12:13

## 2023-01-19 RX ADMIN — LEVOTHYROXINE SODIUM 25 MCG: 0.03 TABLET ORAL at 12:12

## 2023-01-19 RX ADMIN — DONEPEZIL HYDROCHLORIDE 25 MG: 10 TABLET ORAL at 18:25

## 2023-01-19 RX ADMIN — SIMVASTATIN 40 MG: 40 TABLET, FILM COATED ORAL at 18:25

## 2023-01-19 RX ADMIN — CYANOCOBALAMIN TAB 500 MCG 500 MCG: 500 TAB at 12:13

## 2023-01-19 ASSESSMENT — ACTIVITIES OF DAILY LIVING (ADL)
ADLS_ACUITY_SCORE: 44
ADLS_ACUITY_SCORE: 44
ADLS_ACUITY_SCORE: 51
ADLS_ACUITY_SCORE: 53
ADLS_ACUITY_SCORE: 44
ADLS_ACUITY_SCORE: 53
ADLS_ACUITY_SCORE: 51
ADLS_ACUITY_SCORE: 53
ADLS_ACUITY_SCORE: 44
ADLS_ACUITY_SCORE: 51

## 2023-01-19 NOTE — PLAN OF CARE
PRIMARY DIAGNOSIS: Failure to Thrive; Placement  OUTPATIENT/OBSERVATION GOALS TO BE MET BEFORE DISCHARGE:  1. ADLs back to baseline: No    Activity and level of assistance: Ax2; Lift; Total care    2. Pain status: No pain visualized    3. Return to near baseline physical activity: No     Discharge Planner Nurse   Safe discharge environment identified: No  Barriers to discharge: Yes       Entered by: María Elena Ackerman RN 01/18/2023    Pt sitting in chair with chair alarm and seat belt that is on personal wheelchair.  Lift-  Ax2; total cares. Needs feeding for food. On mechanical soft diet. Awaiting placement. SW/CM following.       Please review provider order for any additional goals. Nurse to notify provider when observation goals have been met and patient is ready for discharge.

## 2023-01-19 NOTE — PLAN OF CARE
PRIMARY DIAGNOSIS: FTT, weakness, placement.   OUTPATIENT/OBSERVATION GOALS TO BE MET BEFORE DISCHARGE:  1. ADLs back to baseline: No    2. Activity and level of assistance: Up with maximum assistance, lift & 2 assist.      3. Pain status: Pain free.    4. Return to near baseline physical activity: No     Discharge Planner Nurse   Safe discharge environment identified: No  Barriers to discharge: Yes       Entered by: Domonique Sheldon RN 01/19/2023 1:56 AM      Unable to assess orientation, pt is nonverbal, has been sleeping up until now. Pt repositioned. Medically cleared, awaiting placement.   Please review provider order for any additional goals.   Nurse to notify provider when observation goals have been met and patient is ready for discharge.

## 2023-01-19 NOTE — PLAN OF CARE
PRIMARY DIAGNOSIS: FTT, weakness, placement.   OUTPATIENT/OBSERVATION GOALS TO BE MET BEFORE DISCHARGE:  ADLs back to baseline: No    Activity and level of assistance: Up with maximum assistance, lift & 2 assist.      Pain status: Pain free.    Return to near baseline physical activity: No     Discharge Planner Nurse   Safe discharge environment identified: No  Barriers to discharge: Yes       Entered by: Domonique Sheldon RN 01/18/2023 8:06 PM   Unable to assess orientation, pt is nonverbal, does yell out often; baseline. Pt is resting in bed watching TV. Pt needing placement, SW assisting w/ discharge.   Please review provider order for any additional goals.   Nurse to notify provider when observation goals have been met and patient is ready for discharge.

## 2023-01-19 NOTE — PROGRESS NOTES
[facility]    Medicine Progress Note - Hospitalist Service    Date of Admission:  1/16/2023    Assessment & Plan   Ad Joyner is a 57 year old male with past medical history significant for Down's syndrome with cognitive impairment and memory loss, hypothyroidism, OCD, hearing loss admitted on 1/16/2023 for disposition assistance.      Legal guardians (Evelyn and Mack) have been caring for Mr. Joyner in home for the last 3 years, prior to this resided in a group home. Due to inability to care for the patient in the home, have sought assistance from the ED, hospital. Have been trying to place since end of December as an outpatient but unsuccessful so far.      #Need for disposition assistance  - no acute medical issues  - consult sw, unclear benefit of PT assessment due to cognitive issues but may request for disposition planning     #Down's Syndrome    #Cognitive Impairment with Memory Loss, Functional decline in status  At baseline on admit - largely non verbal, used to wheelchair transfer up until the last month, requires assistance with all adl's.  Resistant to medical cares such as vital sign monitoring, lab draws, clinic visits etc.   Currently calm. Does have history of behavior disturbances described as combativeness but no issues with this for years per family.  Cognitive, functional decline since December, 2022.   - resume Donepezil  - as he is a social admission with no acute medical issues will not be drawing routine labs, vital sign monitoring etc. unless there is a change in status  - Bedside attendant at RN discretion  - on a regular diet that is easy to chew due to lack of dentition, needs assistance with all meals and fluid intake  - Family is ok to continue to help with cares ie med administration, meals   - did discuss goals of care with co - guardian, Evelyn. Restorative, supportive only. I did place a hospice referral for at dismissal and Evelyn is aware of  this. He has had significant functional decline. As he is still eating, no acute medical issues, planning to hold off on formal hospice enrollment at this time.      #Macrocytic Anemia  Hgb near baseline. Does have known hemorrhoid but no significant bleeding symptoms.  - no further work up unless bleeding symptoms       Diet: Mechanical/Dental Soft Diet    DVT Prophylaxis: Pneumatic Compression Devices  Robbins Catheter: Not present  Lines: None     Cardiac Monitoring: None  Code Status: No CPR- Pre-arrest intubation OK         Disposition Plan      Expected Discharge Date: 01/27/2023    Discharge Delays: Placement - LTC  *Medically Ready for Discharge            The patient's care was discussed with the Bedside Nurse and Patient.    Ilene Mcnamara PA-C  Hospitalist Service  [facility]  Securely message with [employer] (more info)  Text page via [employer] Paging/Directory   ______________________________________________________________________    Interval History   Non verbal. Sleeping    Physical Exam   Vital Signs: Temp: 97.9  F (36.6  C) Temp src: Axillary BP: 97/57 Pulse: 92   Resp: 16 SpO2: 91 % O2 Device: None (Room air)    Weight: 0 lbs 0 oz    Alert, breathing spontaneously, appears comfortable    Medical Decision Making       10 MINUTES SPENT BY ME on the date of service doing chart review, history, exam, documentation & further activities per the note.      Data         Imaging results reviewed over the past 24 hrs:   No results found for this or any previous visit (from the past 24 hour(s)).

## 2023-01-19 NOTE — PLAN OF CARE
PRIMARY DIAGNOSIS: FTT, weakness, placement.   OUTPATIENT/OBSERVATION GOALS TO BE MET BEFORE DISCHARGE:  1. ADLs back to baseline: No    2. Activity and level of assistance: Up with maximum assistance, lift & 2 assist.      3. Pain status: Pain free.    4. Return to near baseline physical activity: No     Discharge Planner Nurse   Safe discharge environment identified: No  Barriers to discharge: Yes       Entered by: Domonique Sheldon RN 01/19/2023 5:15 AM      Unable to assess orientation, pt is nonverbal, has been sleeping most of the shift. Medically cleared, awaiting placement.   Please review provider order for any additional goals.   Nurse to notify provider when observation goals have been met and patient is ready for discharge.

## 2023-01-19 NOTE — PROGRESS NOTES
Care Management Follow Up    Expected Discharge Date: 01/27/2023     Concerns to be Addressed:  Discharge planning     Patient plan of care discussed at interdisciplinary rounds: Yes    Anticipated Discharge Disposition: Group Home vs LTC     Patient/Family in Agreement with the Plan:  Yes    Referrals Placed by CM/SW:  Skilled nursing facility  Private pay costs discussed: Not applicable    Additional Information:  LTC referrals sent, no accepting facility at this time. Post acute team is following up on pending referrals. Additionally, guardian and waiver CM pursuing GH placement at this time, no accepting facility thus far. SW will continue to follow.    LUIS M Rea, [facility]   Inpatient Care Coordination  [facility]   [phone]

## 2023-01-19 NOTE — PROGRESS NOTES
PRIMARY DIAGNOSIS: GENERALIZED WEAKNESS/FAILURE TO THRIVE/PLACEMENT    OUTPATIENT/OBSERVATION GOALS TO BE MET BEFORE DISCHARGE  1. Orthostatic performed: N/A    2. Tolerating PO medications: Yes    3. Return to near baseline physical activity: Yes    4. Cleared for discharge by consultants (if involved): No    Discharge Planner Nurse   Safe discharge environment identified: No  Barriers to discharge: Yes       Entered by: Andra Bland RN 01/19/2023 5:32 PM     Please review provider order for any additional goals.   Nurse to notify provider when observation goals have been met and patient is ready for discharge.

## 2023-01-19 NOTE — PROGRESS NOTES
PRIMARY DIAGNOSIS: GENERALIZED WEAKNESS/FAILURE TO THRIVE/PLACEMENT    OUTPATIENT/OBSERVATION GOALS TO BE MET BEFORE DISCHARGE  1. Orthostatic performed: N/A    2. Tolerating PO medications: Yes Crush med/Apple sauce    3. Return to near baseline physical activity: Yes Assist x 2 with lift device    4. Cleared for discharge by consultants (if involved): No    Discharge Planner Nurse   Safe discharge environment identified: No  Barriers to discharge: Yes       Entered by: Andra Bland RN 01/19/2023 5:36 PM     Please review provider order for any additional goals.   Nurse to notify provider when observation goals have been met and patient is ready for discharge.    Pt nonverbal. Awaiting placement. LTC vs Group home. Referral sent but no acceptance at this time. Refusing cares and repositioning at times. More cooperative when family is around. W/C bound at baseline.

## 2023-01-20 PROCEDURE — 99207 PR NO CHARGE LOS: CPT | Performed by: PHYSICIAN ASSISTANT

## 2023-01-20 PROCEDURE — G0378 HOSPITAL OBSERVATION PER HR: HCPCS

## 2023-01-20 PROCEDURE — 250N000013 HC RX MED GY IP 250 OP 250 PS 637: Performed by: PHYSICIAN ASSISTANT

## 2023-01-20 RX ADMIN — ACETAMINOPHEN 650 MG: 325 TABLET, FILM COATED ORAL at 02:32

## 2023-01-20 RX ADMIN — GLYCERIN, PETROLATUM, PHENYLEPHRINE HCL, PRAMOXINE HCL: 144; 2.5; 10; 15 CREAM TOPICAL at 21:05

## 2023-01-20 RX ADMIN — CYANOCOBALAMIN TAB 500 MCG 500 MCG: 500 TAB at 11:29

## 2023-01-20 RX ADMIN — LEVOTHYROXINE SODIUM 25 MCG: 0.03 TABLET ORAL at 11:30

## 2023-01-20 RX ADMIN — SIMVASTATIN 40 MG: 40 TABLET, FILM COATED ORAL at 17:03

## 2023-01-20 RX ADMIN — DONEPEZIL HYDROCHLORIDE 25 MG: 10 TABLET ORAL at 17:03

## 2023-01-20 RX ADMIN — GLYCERIN, PETROLATUM, PHENYLEPHRINE HCL, PRAMOXINE HCL: 144; 2.5; 10; 15 CREAM TOPICAL at 11:46

## 2023-01-20 RX ADMIN — ALLOPURINOL 200 MG: 100 TABLET ORAL at 11:29

## 2023-01-20 RX ADMIN — Medication 1 MG: at 02:32

## 2023-01-20 RX ADMIN — Medication 1 TABLET: at 11:29

## 2023-01-20 ASSESSMENT — ACTIVITIES OF DAILY LIVING (ADL)
ADLS_ACUITY_SCORE: 53

## 2023-01-20 ASSESSMENT — COLUMBIA-SUICIDE SEVERITY RATING SCALE - C-SSRS: IS THE PATIENT NOT ABLE TO COMPLETE C-SSRS: UNABLE TO VERBALIZE

## 2023-01-20 NOTE — PROGRESS NOTES
PRIMARY DIAGNOSIS: GENERALIZED WEAKNESS/FTT/PLACEMENT    OBSERVATION GOALS TO BE MET BEFORE DISCHARGE  1. Orthostatic performed: N/A    2. Tolerating PO medications: Yes, crush med/Apple sauce    3. Return to near baseline physical activity: Yes, assist x 2 with lift device    4. Cleared for discharge by consultants (if involved): Yes    Discharge Planner Nurse   Safe discharge environment identified: No  Barriers to discharge: Yes       Entered by: Fiordaliza Saba RN 01/19/2023       Pt alert, JONO orientation d/t pt nonverbal. Lung sounds CTA, bowel sounds active. No exhibits no signs of pain, no SOB noted, sats 95% on RA. Pt noted with good appetite, family member fed him dinner. Medically cleared, awaiting for placement. LTC vs Group home. Referral sent but no acceptance at this time. Pt is assist of 2 with device lift. Now in bed sleeping. Will continue to monitor.    /61 (BP Location: Left arm)   Pulse 86   Temp 97.8  F (36.6  C) (Oral)   Resp 16   SpO2 94%     Please review provider order for any additional goals.   Nurse to notify provider when observation goals have been met and patient is ready for discharge.

## 2023-01-20 NOTE — PROGRESS NOTES
PRIMARY DIAGNOSIS: GENERALIZED WEAKNESS/FTT/PLACEMENT    OBSERVATION GOALS TO BE MET BEFORE DISCHARGE  1. Orthostatic performed: N/A    2. Tolerating PO medications: Yes, crush med/Apple sauce    3. Return to near baseline physical activity: Yes, assist x 2 with lift device    4. Cleared for discharge by consultants (if involved): Yes    Discharge Planner Nurse   Safe discharge environment identified: No  Barriers to discharge: Yes       Entered by: Fiordaliza Saba RN 01/19/2023       Pt alert, JONO orientation d/t pt nonverbal. Lung sounds CTA, bowel sounds active. No exhibits no signs of pain, no SOB noted, sats 95% on RA. Pt noted with good appetite, family member fed him dinner. Medically cleared, awaiting for placement. LTC vs Group home. Referral sent but no acceptance at this time. Pt refusing cares and repositioning. Pt is assist of 2 with device lift. Will continue to monitor.    /62 (BP Location: Right arm)   Pulse 93   Temp 98  F (36.7  C) (Axillary)   Resp 16   SpO2 93%     Please review provider order for any additional goals.   Nurse to notify provider when observation goals have been met and patient is ready for discharge.

## 2023-01-20 NOTE — PROGRESS NOTES
PRIMARY DIAGNOSIS: Placement  OUTPATIENT/OBSERVATION GOALS TO BE MET BEFORE DISCHARGE:  1. ADLs back to baseline: Yes    2. Activity and level of assistance: Up with lift assistance, at baseline    3. Pain status: Improved-controlled with oral pain medications.    4. Return to near baseline physical activity: Yes     Discharge Planner Nurse   Safe discharge environment identified: No  Barriers to discharge: No       Entered by: Nilda Arias RN 01/20/2023   A/O, nonverbal at baseline. VSS on RA. Assist of 2x, lift, bedbound at baseline. Tolerating mechanical soft diet. Lung sounds clear, diminished. Bowel sounds active. Passing flatus. Incontinent of both bowels and bladder. Skin intact. Pain managed with prn tylenol. Consults: SW/CM. Plan: LTC vs GH placement.     Please review provider order for any additional goals.   Nurse to notify provider when observation goals have been met and patient is ready for discharge.

## 2023-01-20 NOTE — PROGRESS NOTES
"Care Management Follow Up    Expected Discharge Date: 01/31/2023     Concerns to be Addressed: Discharge planning      Patient plan of care discussed at interdisciplinary rounds: Yes    Anticipated Discharge Disposition: half-way    Patient/family educated on Medicare website which has current facility and service quality ratings:  Yes  Patient/Family in Agreement with the Plan:  Yes    Referrals Placed by CM/SW:  Group Home, Skilled Nursing facility  Private pay costs discussed: Not applicable    Additional Information:  SW received call from patient's guardian Evelyn [phone]. She reports that they have obtained placement for patient at a group home [facility] through [place] agency. She reports that the tentative move in date is 1/31. She reports GH manager will be calling SW today or early next week to confirm/coordinate next steps. SW will continue to follow.     LUIS M Rea, [facility]   Inpatient Care Coordination  [facility]   [phone]        " Topical Clindamycin Counseling: Patient counseled that this medication may cause skin irritation or allergic reactions.  In the event of skin irritation, the patient was advised to reduce the amount of the drug applied or use it less frequently.   The patient verbalized understanding of the proper use and possible adverse effects of clindamycin.  All of the patient's questions and concerns were addressed.

## 2023-01-20 NOTE — PROGRESS NOTES
PRIMARY DIAGNOSIS: Placement  OUTPATIENT/OBSERVATION GOALS TO BE MET BEFORE DISCHARGE:  1. ADLs back to baseline: Yes    2. Activity and level of assistance: Up with lift assistance, at baseline    3. Pain status: Improved-controlled with oral pain medications.    4. Return to near baseline physical activity: Yes     Discharge Planner Nurse   Safe discharge environment identified: No  Barriers to discharge: No       Entered by: Nilda Arias RN 01/20/2023   A/O, nonverbal at baseline. VSS on RA. Assist of 2x, lift, bedbound at baseline. Tolerating mechanical soft diet. Lung sounds clear, diminished. Bowel sounds active. Passing flatus. Incontinent of both bowels and bladder. Skin intact. Pain managed with prn tylenol. Up to BSC and WC this afternoon. Consults: SW/CM. Plan: LTC vs GH placement.      Please review provider order for any additional goals.   Nurse to notify provider when observation goals have been met and patient is ready for discharge.

## 2023-01-20 NOTE — PLAN OF CARE
PRIMARY DIAGNOSIS: GENERALIZED WEAKNESS/placement    OUTPATIENT/OBSERVATION GOALS TO BE MET BEFORE DISCHARGE  1. Orthostatic performed: No    2. Tolerating PO medications: Yes    3. Return to near baseline physical activity: Yes    4. Cleared for discharge by consultants (if involved): Yes    Discharge Planner Nurse   Safe discharge environment identified: No  Barriers to discharge: Yes       Entered by: Amanda Carrasco RN 01/20/2023 3:09 AM     Please review provider order for any additional goals.   Nurse to notify provider when observation goals have been met and patient is ready for discharge.

## 2023-01-20 NOTE — PLAN OF CARE
PRIMARY DIAGNOSIS: GENERALIZED WEAKNESS/placement    OUTPATIENT/OBSERVATION GOALS TO BE MET BEFORE DISCHARGE  1. Orthostatic performed: No    2. Tolerating PO medications: Yes crushed with apple sauce.    3. Return to near baseline physical activity: Yes    4. Cleared for discharge by consultants (if involved): Yes    Vitals are Temp: 97.8  F (36.6  C) Temp src: Oral BP: 105/61 Pulse: 86   Resp: 16 SpO2: 94 %.     Pt is alert, pt is assistX2 with lift device, pt is mechanical soft diet. Pt appears, uncomfortable and in pain given prn tylenol and it was affective. Pt sleeping now comfortabley. LTC referral send awaiting for placement.  follow up. Will con to monitor and update prn.         Discharge Planner Nurse   Safe discharge environment identified: No  Barriers to discharge: Yes       Entered by: Amanda Carrasco RN 01/20/2023 5:03 AM     Please review provider order for any additional goals.   Nurse to notify provider when observation goals have been met and patient is ready for discharge.

## 2023-01-20 NOTE — PROGRESS NOTES
[facility]    Medicine Progress Note - Hospitalist Service    Date of Admission:  1/16/2023    Assessment & Plan   Ad Joyner is a 57 year old male with past medical history significant for Down's syndrome with cognitive impairment and memory loss, hypothyroidism, OCD, hearing loss admitted on 1/16/2023 for disposition assistance.      Legal guardians (Evelyn and Mack) have been caring for Mr. Joyner in home for the last 3 years, prior to this resided in a group home. Due to inability to care for the patient in the home, have sought assistance from the ED, hospital. Have been trying to place since end of December as an outpatient but unsuccessful so far.      #Need for disposition assistance  - no acute medical issues  - consult sw, unclear benefit of PT assessment due to cognitive issues but may request for disposition planning     #Down's Syndrome    #Cognitive Impairment with Memory Loss, Functional decline in status  At baseline on admit - largely non verbal, used to wheelchair transfer up until the last month, requires assistance with all adl's.  Resistant to medical cares such as vital sign monitoring, lab draws, clinic visits etc.   Currently calm. Does have history of behavior disturbances described as combativeness but no issues with this for years per family.  Cognitive, functional decline since December, 2022.   - resume Donepezil  - as he is a social admission with no acute medical issues will not be drawing routine labs, vital sign monitoring etc. unless there is a change in status  - Bedside attendant at RN discretion  - on a regular diet that is easy to chew due to lack of dentition, needs assistance with all meals and fluid intake  - Family is ok to continue to help with cares ie med administration, meals   - did discuss goals of care with co - guardian, Evelyn. Restorative, supportive only. I did place a hospice referral for at dismissal and Evelyn is aware of  this. He has had significant functional decline. As he is still eating, no acute medical issues, planning to hold off on formal hospice enrollment at this time.      #Macrocytic Anemia  Hgb near baseline. Does have known hemorrhoid but no significant bleeding symptoms.  - no further work up unless bleeding symptoms         Diet: Mechanical/Dental Soft Diet    DVT Prophylaxis: None ordered  Robbins Catheter: Not present  Lines: None     Cardiac Monitoring: None  Code Status: No CPR- Pre-arrest intubation OK         Disposition Plan      Expected Discharge Date: 01/27/2023    Discharge Delays: Placement - LTC  *Medically Ready for Discharge            The patient's care was discussed with the Bedside Nurse and Patient.    Ilene Mcnamara PA-C  Hospitalist Service  [facility]  Securely message with [email or web address] (more info)  Text page via [employer] Paging/Directory   ______________________________________________________________________    Interval History   Stable. No new concerns      Physical Exam   Vital Signs: Temp: 97.8  F (36.6  C) Temp src: Oral BP: 105/61 Pulse: 86   Resp: 16 SpO2: 94 % O2 Device: None (Room air)    Weight: 0 lbs 0 oz    Awake, laying in bed, non verbal, does not follow commands, breathing spontaneously      Medical Decision Making       10 MINUTES SPENT BY ME on the date of service doing chart review, history, exam, documentation & further activities per the note.          Imaging results reviewed over the past 24 hrs:   No results found for this or any previous visit (from the past 24 hour(s)).

## 2023-01-21 PROCEDURE — G0378 HOSPITAL OBSERVATION PER HR: HCPCS

## 2023-01-21 PROCEDURE — 99207 PR NO CHARGE LOS: CPT | Performed by: PHYSICIAN ASSISTANT

## 2023-01-21 PROCEDURE — 250N000013 HC RX MED GY IP 250 OP 250 PS 637: Performed by: PHYSICIAN ASSISTANT

## 2023-01-21 RX ADMIN — Medication 1 MG: at 01:39

## 2023-01-21 RX ADMIN — CYANOCOBALAMIN TAB 500 MCG 500 MCG: 500 TAB at 08:34

## 2023-01-21 RX ADMIN — LEVOTHYROXINE SODIUM 50 MCG: 0.03 TABLET ORAL at 08:36

## 2023-01-21 RX ADMIN — ACETAMINOPHEN 650 MG: 325 TABLET, FILM COATED ORAL at 01:39

## 2023-01-21 RX ADMIN — ALLOPURINOL 200 MG: 100 TABLET ORAL at 08:34

## 2023-01-21 RX ADMIN — Medication 1 TABLET: at 08:34

## 2023-01-21 RX ADMIN — SIMVASTATIN 40 MG: 40 TABLET, FILM COATED ORAL at 17:10

## 2023-01-21 RX ADMIN — GLYCERIN, PETROLATUM, PHENYLEPHRINE HCL, PRAMOXINE HCL: 144; 2.5; 10; 15 CREAM TOPICAL at 15:05

## 2023-01-21 RX ADMIN — DONEPEZIL HYDROCHLORIDE 25 MG: 10 TABLET ORAL at 17:10

## 2023-01-21 RX ADMIN — GLYCERIN, PETROLATUM, PHENYLEPHRINE HCL, PRAMOXINE HCL: 144; 2.5; 10; 15 CREAM TOPICAL at 08:36

## 2023-01-21 ASSESSMENT — ACTIVITIES OF DAILY LIVING (ADL)
ADLS_ACUITY_SCORE: 53

## 2023-01-21 NOTE — PLAN OF CARE
PRIMARY DIAGNOSIS: GENERALIZED WEAKNESS/Placement     1. Orthostatic performed: N/A    2. Tolerating PO medications: Yes    3. Return to near baseline physical activity: Yes    4. Cleared for discharge by consultants (if involved): Yes     Discharge Planner Nurse   Safe discharge environment identified: No  Barriers to discharge: Yes   A & O. Patient is nonverbal at baseline. Lung sound diminished to lower lobes. No S/SX of respiratory distress. Bowel sound present all quadrants. Incontinent of B & B . A X 2 with a lift.  PRN Tylenol administered for pain. Patient on mechanical soft diet. Awaiting for GH placement. SW following .  /54 (BP Location: Right arm)   Pulse 67   Temp 98.7  F (37.1  C) (Axillary)   Resp 16   SpO2 95%        Entered by: Cedric Pierce RN 01/20/2023 04:17     Please review provider order for any additional goals.   Nurse to notify provider when observation goals have been met and patient is ready for discharge.

## 2023-01-21 NOTE — PLAN OF CARE
PRIMARY DIAGNOSIS: Placement  OUTPATIENT/OBSERVATION GOALS TO BE MET BEFORE DISCHARGE:  1. ADLs back to baseline: Yes    2. Activity and level of assistance: Up with maximum assistance.     3. Pain status: Monitored with PAINAD scale.  No pain per scale.      4. Return to near baseline physical activity: Yes     Discharge Planner Nurse   Safe discharge environment identified: Yes  Barriers to discharge: Yes       Entered by: Kassandra Guevara RN 01/21/2023 4:55 PM     Please review provider order for any additional goals.   Nurse to notify provider when observation goals have been met and patient is ready for discharge.

## 2023-01-21 NOTE — PROGRESS NOTES
PRIMARY DIAGNOSIS: Placement/ Down's Syndrome/Generalized weakness   OUTPATIENT/OBSERVATION GOALS TO BE MET BEFORE DISCHARGE:  1. ADLs back to baseline: Yes    2. Activity and level of assistance: Up with maximum assistance. Consider SW and/or PT evaluation.     3. Pain status: Pain free.    4. Return to near baseline physical activity: Yes     Discharge Planner Nurse   Safe discharge environment identified: No, placement pending.   Barriers to discharge: Yes      /54 (BP Location: Right arm)   Pulse 67   Temp 98.7  F (37.1  C) (Axillary)   Resp 16   SpO2 95%       Non-verbal, total cares. No peripheral IV. Meds crushed with applesauce.

## 2023-01-21 NOTE — PROGRESS NOTES
[facility]    Medicine Progress Note - Hospitalist Service    Date of Admission:  1/16/2023    Assessment & Plan   Ad Joyner is a 57 year old male with past medical history significant for Down's syndrome with cognitive impairment and memory loss, hypothyroidism, OCD, hearing loss admitted on 1/16/2023 for disposition assistance.      Legal guardians (Evelyn and Mack) have been caring for Mr. Joyner in home for the last 3 years, prior to this resided in a group home. Due to inability to care for the patient in the home, have sought assistance from the ED, hospital. Have been trying to place since end of December as an outpatient but unsuccessful so far.      #Need for disposition assistance  - no acute medical issues  - consult sw, unclear benefit of PT assessment due to cognitive issues but may request for disposition planning     #Down's Syndrome    #Cognitive Impairment with Memory Loss, Functional decline in status  At baseline on admit - largely non verbal, used to wheelchair transfer up until the last month, requires assistance with all adl's.  Resistant to medical cares such as vital sign monitoring, lab draws, clinic visits etc.   Currently calm. Does have history of behavior disturbances described as combativeness but no issues with this for years per family.  Cognitive, functional decline since December, 2022.   - resume Donepezil  - as he is a social admission with no acute medical issues will not be drawing routine labs, vital sign monitoring etc. unless there is a change in status  - Bedside attendant at RN discretion  - on a regular diet that is easy to chew due to lack of dentition, needs assistance with all meals and fluid intake  - Family is ok to continue to help with cares ie med administration, meals   - did discuss goals of care with co - guardian, Evelyn. Restorative, supportive only. I did place a hospice referral for at dismissal and Evelyn is aware of  this. He has had significant functional decline. As he is still eating, no acute medical issues, planning to hold off on formal hospice enrollment at this time.      #Macrocytic Anemia  Hgb near baseline. Does have known hemorrhoid but no significant bleeding symptoms.  - no further work up unless bleeding symptoms           Diet: Mechanical/Dental Soft Diet    DVT Prophylaxis: None ordered  Robbins Catheter: Not present  Lines: None     Cardiac Monitoring: None  Code Status: No CPR- Pre-arrest intubation OK           Disposition Plan     Expected Discharge Date: 01/31/2023    Discharge Delays: Placement - LTC  *Medically Ready for Discharge            The patient's care was discussed with the Bedside Nurse and Patient.    Ilene Mcnamara PA-C  Hospitalist Service  [facility]  Securely message with [employer] (more info)  Text page via [employer] Paging/Directory   ______________________________________________________________________    Interval History   No events overnight    Physical Exam   Vital Signs: Temp: 97.4  F (36.3  C) Temp src: Axillary BP: 109/57 Pulse: 72   Resp: 16 SpO2: 95 % O2 Device: None (Room air)    Weight: 0 lbs 0 oz    Patient was sleeping but easily arousable. Large protruding tongue noted looks stable. Breathing comfortably on room air. Bowel sounds present. Does not like me pushing on abdomen.     Medical Decision Making       10 MINUTES SPENT BY ME on the date of service doing chart review, history, exam, documentation & further activities per the note.      Data         Imaging results reviewed over the past 24 hrs:   No results found for this or any previous visit (from the past 24 hour(s)).

## 2023-01-21 NOTE — PROGRESS NOTES
PRIMARY DIAGNOSIS: Placement/ Down's Syndrome/Generalized weakness   OUTPATIENT/OBSERVATION GOALS TO BE MET BEFORE DISCHARGE:  1. ADLs back to baseline: Yes    2. Activity and level of assistance: Up with maximum assistance. Consider SW and/or PT evaluation.     3. Pain status: Pain free.    4. Return to near baseline physical activity: Yes     Discharge Planner Nurse   Safe discharge environment identified: No, placement pending.   Barriers to discharge: Yes      /57 (BP Location: Right arm)   Pulse 72   Temp 97.4  F (36.3  C) (Axillary)   Resp 16   SpO2 95%       Non-verbal, total cares. No peripheral IV. Meds crushed with applesauce. Up in wheelchair with lift. Preventative mepilex applied to sacrum.

## 2023-01-21 NOTE — PLAN OF CARE
PRIMARY DIAGNOSIS: GENERALIZED WEAKNESS/Placement     1. Orthostatic performed: N/A    2. Tolerating PO medications: Yes    3. Return to near baseline physical activity: Yes    4. Cleared for discharge by consultants (if involved): No    Discharge Planner Nurse   Safe discharge environment identified: No  Barriers to discharge: Yes       Entered by: Cedric Pierce RN 01/20/2023 20:00     Please review provider order for any additional goals.   Nurse to notify provider when observation goals have been met and patient is ready for discharge.

## 2023-01-22 PROCEDURE — 250N000013 HC RX MED GY IP 250 OP 250 PS 637: Performed by: PHYSICIAN ASSISTANT

## 2023-01-22 PROCEDURE — 99207 PR NO CHARGE LOS: CPT | Performed by: PHYSICIAN ASSISTANT

## 2023-01-22 PROCEDURE — G0378 HOSPITAL OBSERVATION PER HR: HCPCS

## 2023-01-22 RX ADMIN — CYANOCOBALAMIN TAB 500 MCG 500 MCG: 500 TAB at 08:36

## 2023-01-22 RX ADMIN — GLYCERIN, PETROLATUM, PHENYLEPHRINE HCL, PRAMOXINE HCL 1 G: 144; 2.5; 10; 15 CREAM TOPICAL at 08:36

## 2023-01-22 RX ADMIN — SIMVASTATIN 40 MG: 40 TABLET, FILM COATED ORAL at 19:59

## 2023-01-22 RX ADMIN — ALLOPURINOL 200 MG: 100 TABLET ORAL at 08:35

## 2023-01-22 RX ADMIN — Medication 1 TABLET: at 08:35

## 2023-01-22 RX ADMIN — LEVOTHYROXINE SODIUM 50 MCG: 0.03 TABLET ORAL at 08:39

## 2023-01-22 RX ADMIN — DONEPEZIL HYDROCHLORIDE 25 MG: 10 TABLET ORAL at 19:58

## 2023-01-22 RX ADMIN — GLYCERIN, PETROLATUM, PHENYLEPHRINE HCL, PRAMOXINE HCL: 144; 2.5; 10; 15 CREAM TOPICAL at 13:53

## 2023-01-22 RX ADMIN — GLYCERIN, PETROLATUM, PHENYLEPHRINE HCL, PRAMOXINE HCL: 144; 2.5; 10; 15 CREAM TOPICAL at 22:42

## 2023-01-22 ASSESSMENT — ACTIVITIES OF DAILY LIVING (ADL)
ADLS_ACUITY_SCORE: 53
ADLS_ACUITY_SCORE: 53
ADLS_ACUITY_SCORE: 55
ADLS_ACUITY_SCORE: 53
ADLS_ACUITY_SCORE: 48
ADLS_ACUITY_SCORE: 53
ADLS_ACUITY_SCORE: 48
ADLS_ACUITY_SCORE: 53
ADLS_ACUITY_SCORE: 48
ADLS_ACUITY_SCORE: 48
ADLS_ACUITY_SCORE: 55
ADLS_ACUITY_SCORE: 53

## 2023-01-22 NOTE — PLAN OF CARE
PRIMARY DIAGNOSIS: GENERALIZED WEAKNESS/Placement     1. Orthostatic performed: N/A    2. Tolerating PO medications: Yes    3. Return to near baseline physical activity: Yes    4. Cleared for discharge by consultants (if involved): Yes     Discharge Planner Nurse   Safe discharge environment identified: No  Barriers to discharge: Yes   A & O. Patient is nonverbal at baseline. Lung sound  clear. No S/SX of respiratory distress. Bowel sound present all quadrants. Incontinent of B & B . Takes meds crushed with applesauce. A X 2 with a lift. Patient on mechanical soft diet. Awaiting for GH placement. SW following .  /57 (BP Location: Right arm)   Pulse 72   Temp 97.4  F (36.3  C) (Axillary)   Resp 16   SpO2 95%        Entered by: Cedric Pierce RN 01/22/2023 05:20     Please review provider order for any additional goals.   Nurse to notify provider when observation goals have been met and patient is ready for discharge.

## 2023-01-22 NOTE — PROGRESS NOTES
PRIMARY DIAGNOSIS: PLACEMENT/WEAKNESS  OUTPATIENT/OBSERVATION GOALS TO BE MET BEFORE DISCHARGE:  1. ADLs back to baseline: Yes    2. Activity and level of assistance: Up with maximum assistance. Lift     3. Pain status: Pain free. No signs of pain.     4. Return to near baseline physical activity: Yes     Discharge Planner Nurse   Safe discharge environment identified: Yes  Barriers to discharge: Yes       Guardians working on placement, likely to go to [facility], pending final confirmation. Up in chair for breakfast. No IV access. Preventative mepilex applied to sacrum, blanchable redness present. Meds crushed with applesauce. No teeth, mechanical soft diet, food suggestions written on white board in room.

## 2023-01-22 NOTE — PROGRESS NOTES
PRIMARY DIAGNOSIS: PLACEMENT/WEAKNESS  OUTPATIENT/OBSERVATION GOALS TO BE MET BEFORE DISCHARGE:  1. ADLs back to baseline: Yes    2. Activity and level of assistance: Up with maximum assistance. Lift     3. Pain status: Pain free. No signs of pain.     4. Return to near baseline physical activity: Yes     Discharge Planner Nurse   Safe discharge environment identified: Yes  Barriers to discharge: Yes       Guardians working on placement, likely to go to [facility], pending final confirmation. Up in chair for breakfast. No IV access. Preventative mepilex applied to sacrum, blanchable redness present. Meds crushed with applesauce. No teeth, mechanical soft diet, food suggestions written on white board in room.

## 2023-01-22 NOTE — PLAN OF CARE
PRIMARY DIAGNOSIS: Placement  OUTPATIENT/OBSERVATION GOALS TO BE MET BEFORE DISCHARGE:  1. ADLs back to baseline: Yes    2. Activity and level of assistance: Up with maximum assistance.     3. Pain status: Pain free.    4. Return to near baseline physical activity: Yes     Discharge Planner Nurse   Safe discharge environment identified: No  Barriers to discharge: Yes       Entered by: Kassandra Guevara RN 01/22/2023 5:20 PM   Pt calling out loudly.  Can be heard throughout unit.  Staff changed Pt's brief.  Pt then fell asleep.  Pain free via PAINAD scale.  Pt's door is open and room is near nursing station.    Please review provider order for any additional goals.   Nurse to notify provider when observation goals have been met and patient is ready for discharge.Goal Outcome Evaluation:

## 2023-01-22 NOTE — PLAN OF CARE
PRIMARY DIAGNOSIS: Placement  OUTPATIENT/OBSERVATION GOALS TO BE MET BEFORE DISCHARGE:  1. ADLs back to baseline: Yes    2. Activity and level of assistance: Up with maximum assistance.     3. Pain status: Pain free.    4. Return to near baseline physical activity: Yes     Discharge Planner Nurse   Safe discharge environment identified: Yes  Barriers to discharge: Yes       Entered by: Kassandra Guevara RN 01/21/2023 8:15 PM   Pt on commode with assist of 2 and lift.  Attempted to have Pt stand with walker and transfer, but Pt refused.  Pt ate well.  Received meds crushed in applesauce.  Incontinence care completed.  Preventative mepilex on sacrum.    Please review provider order for any additional goals.   Nurse to notify provider when observation goals have been met and patient is ready for discharge.Goal Outcome Evaluation:

## 2023-01-22 NOTE — PROGRESS NOTES
[facility]    Medicine Progress Note - Hospitalist Service    Date of Admission:  1/16/2023    Assessment & Plan   Ad Joyner is a 57 year old male with past medical history significant for Down's syndrome with cognitive impairment and memory loss, hypothyroidism, OCD, hearing loss admitted on 1/16/2023 for disposition assistance.      Legal guardians (Evelyn and Mack) have been caring for Mr. Joyner in home for the last 3 years, prior to this resided in a group home. Due to inability to care for the patient in the home, have sought assistance from the ED, hospital. Have been trying to place since end of December as an outpatient but unsuccessful so far.      #Need for disposition assistance  - no acute medical issues  - consult sw, unclear benefit of PT assessment due to cognitive issues but may request for disposition planning  -Apparently his guardians have found a new group home that can take him on January 31     #Down's Syndrome    #Cognitive Impairment with Memory Loss, Functional decline in status  At baseline on admit - largely non verbal, used to wheelchair transfer up until the last month, requires assistance with all adl's.  Resistant to medical cares such as vital sign monitoring, lab draws, clinic visits etc.   Currently calm. Does have history of behavior disturbances described as combativeness but no issues with this for years per family.  Cognitive, functional decline since December, 2022.   - resume Donepezil  - as he is a social admission with no acute medical issues will not be drawing routine labs, vital sign monitoring etc. unless there is a change in status  - Bedside attendant at RN discretion  - on a regular diet that is easy to chew due to lack of dentition, needs assistance with all meals and fluid intake  - Family is ok to continue to help with cares ie med administration, meals   - did discuss goals of care with co - guardian, Evelyn. Restorative,  supportive only. I did place a hospice referral for at dismissal and Evelyn is aware of this. He has had significant functional decline. As he is still eating, no acute medical issues, planning to hold off on formal hospice enrollment at this time.      #Macrocytic Anemia  Hgb near baseline. Does have known hemorrhoid but no significant bleeding symptoms.  - no further work up unless bleeding symptoms             Diet: Mechanical/Dental Soft Diet    DVT Prophylaxis: None ordered  Robbins Catheter: Not present  Lines: None     Cardiac Monitoring: None  Code Status: No CPR- Pre-arrest intubation OK        Disposition Plan     Expected Discharge Date: 01/31/2023    Discharge Delays: Placement - LTC  *Medically Ready for Discharge            The patient's care was discussed with the Bedside Nurse and Patient.    Ilene Mcnamara PA-C  Hospitalist Service  [facility]  Securely message with [employer] (more info)  Text page via [employer] Paging/Directory   ______________________________________________________________________    Interval History   No complaints overnight    Physical Exam   Vital Signs: Temp: 96.9  F (36.1  C) Temp src: Axillary BP: 95/59 Pulse: 68   Resp: 16 SpO2: 95 % O2 Device: None (Room air)    Weight: 0 lbs 0 oz    Patient was sitting in wheelchair sorting his baseball cards when I went into room today.  He was smiley and appeared comfortable.  He was breathing comfortably on room air.    Medical Decision Making       10 MINUTES SPENT BY ME on the date of service doing chart review, history, exam, documentation & further activities per the note.      Data         Imaging results reviewed over the past 24 hrs:   No results found for this or any previous visit (from the past 24 hour(s)).

## 2023-01-23 ENCOUNTER — APPOINTMENT (OUTPATIENT)
Dept: PHYSICAL THERAPY | Facility: CLINIC | Age: 58
End: 2023-01-23
Payer: MEDICARE

## 2023-01-23 PROCEDURE — 250N000013 HC RX MED GY IP 250 OP 250 PS 637: Performed by: PHYSICIAN ASSISTANT

## 2023-01-23 PROCEDURE — 97530 THERAPEUTIC ACTIVITIES: CPT | Mod: GP | Performed by: PHYSICAL THERAPIST

## 2023-01-23 PROCEDURE — G0378 HOSPITAL OBSERVATION PER HR: HCPCS

## 2023-01-23 PROCEDURE — 99231 SBSQ HOSP IP/OBS SF/LOW 25: CPT | Performed by: PHYSICIAN ASSISTANT

## 2023-01-23 RX ADMIN — SIMVASTATIN 40 MG: 40 TABLET, FILM COATED ORAL at 18:10

## 2023-01-23 RX ADMIN — GLYCERIN, PETROLATUM, PHENYLEPHRINE HCL, PRAMOXINE HCL: 144; 2.5; 10; 15 CREAM TOPICAL at 14:56

## 2023-01-23 RX ADMIN — LEVOTHYROXINE SODIUM 25 MCG: 0.03 TABLET ORAL at 10:20

## 2023-01-23 RX ADMIN — Medication 1 TABLET: at 10:21

## 2023-01-23 RX ADMIN — CYANOCOBALAMIN TAB 500 MCG 500 MCG: 500 TAB at 10:21

## 2023-01-23 RX ADMIN — DONEPEZIL HYDROCHLORIDE 25 MG: 10 TABLET ORAL at 18:10

## 2023-01-23 RX ADMIN — ALLOPURINOL 200 MG: 100 TABLET ORAL at 10:21

## 2023-01-23 ASSESSMENT — ACTIVITIES OF DAILY LIVING (ADL)
ADLS_ACUITY_SCORE: 51

## 2023-01-23 NOTE — PLAN OF CARE
PRIMARY DIAGNOSIS: Gerneralized Weakness/Placement  OUTPATIENT/OBSERVATION GOALS TO BE MET BEFORE DISCHARGE:  1. ADLs back to baseline: Yes    2. Activity and level of assistance: Up with maximum assistance. Total cares     3. Pain status: Pain free; nonverbal cues absent.    4. Return to near baseline physical activity: No     Discharge Planner Nurse   Safe discharge environment identified: No  Barriers to discharge: Yes       Entered by: María Elena Ackerman RN 01/23/2023    Pt alert to self. Nonverbal at baseline. Soft BP this morning, tried to recheck, pt did not allow. Will recheck when pt is more cooperative. Will call out loudly, can hear in the halls. Offered assistance to BSC, lift Ax2. Incontinent of B&B. Awaiting placement. SW following.      Please review provider order for any additional goals. Nurse to notify provider when observation goals have been met and patient is ready for discharge.

## 2023-01-23 NOTE — PLAN OF CARE
PRIMARY DIAGNOSIS: GENERALIZED WEAKNESS/Placement     1. Orthostatic performed: N/A    2. Tolerating PO medications: Yes    3. Return to near baseline physical activity: Yes    4. Cleared for discharge by consultants (if involved): Yes     Discharge Planner Nurse   Safe discharge environment identified: No  Barriers to discharge: Yes   A & O. Patient is nonverbal at baseline. Lung sound  clear. Bowel sound present all quadrants. Incontinent of B & B . Takes meds crushed with applesauce. A X 2 with a lift. Patient on mechanical soft diet. Awaiting for GH placement. SW following.  BP 95/59 (BP Location: Right arm)   Pulse 68   Temp 96.9  F (36.1  C) (Axillary)   Resp 16   SpO2 95%        Entered by: Cedric Pierce RN 01/22/2023 04:09     Please review provider order for any additional goals.   Nurse to notify provider when observation goals have been met and patient is ready for discharge.

## 2023-01-23 NOTE — PROGRESS NOTES
Care Management Follow Up    Expected Discharge Date: 01/31/2023     Concerns to be Addressed: Discharge planning      Patient plan of care discussed at interdisciplinary rounds: Yes    Anticipated Discharge Disposition: [facility]     Patient/Family in Agreement with the Plan:  Yes    Referrals Placed by CM/SW:  Skilled Nursing Facility  Private pay costs discussed: Not applicable    Additional Information:  SW emailed pt's waiver LINDY Linder to follow up. SW was told by CM that [facility] would be reaching out to  to coordinate pt's move to  1/31 and send clinical information for medications, etc.  emailed CM asking for contact information for  to coordinate. Awaiting response. KELLI will continue to follow.     LUIS M Rea, [facility]   Inpatient Care Coordination  [facility]   [phone]

## 2023-01-23 NOTE — PLAN OF CARE
PRIMARY DIAGNOSIS: GENERALIZED WEAKNESS/Placement     1. Orthostatic performed: N/A    2. Tolerating PO medications: Yes    3. Return to near baseline physical activity: Yes    4. Cleared for discharge by consultants (if involved): No    Discharge Planner Nurse   Safe discharge environment identified: No  Barriers to discharge: Yes       Entered by: Cedric Pierce RN 01/20/2023 00:13     Please review provider order for any additional goals.   Nurse to notify provider when observation goals have been met and patient is ready for discharge.

## 2023-01-23 NOTE — PROGRESS NOTES
[facility]    Medicine Progress Note - Hospitalist Service    Date of Admission:  1/16/2023    Assessment & Plan   Ad Joyner is a 57 year old male with past medical history significant for Down's syndrome with cognitive impairment and memory loss, hypothyroidism, OCD, hearing loss admitted on 1/16/2023 for disposition assistance.      Legal guardians (Evelyn and Mack) have been caring for Mr. Joyner in home for the last 3 years, prior to this resided in a group home. Due to inability to care for the patient in the home, have sought assistance from the ED, hospital. Have been trying to place since end of December as an outpatient but unsuccessful so far.     #Need for disposition assistance  - no acute medical issues  - SW consulted and following  - apparently his guardians have found a new group home that can take him on January 31     #Down's Syndrome    #Cognitive Impairment with Memory Loss, Functional decline in status  At baseline on admit - largely non verbal, used to wheelchair transfer up until the last month, requires assistance with all adl's.  Resistant to medical cares such as vital sign monitoring, lab draws, clinic visits etc.   Currently calm. Does have history of behavior disturbances described as combativeness but no issues with this for years per family.  Cognitive, functional decline since December, 2022.   - resume Donepezil  - as he is a social admission with no acute medical issues will not be drawing routine labs, vital sign monitoring etc. unless there is a change in status  - Bedside attendant at RN discretion  - on a regular diet that is easy to chew due to lack of dentition, needs assistance with all meals and fluid intake  - Family is ok to continue to help with cares ie med administration, meals   - per previous discussions with co - guardian, Evelyn. Restorative, supportive only. Hospice referral for at dismissal placed and Evelyn is aware of this.  He has had significant functional decline. As he is still eating, no acute medical issues, planning to hold off on formal hospice enrollment at this time.      #Macrocytic Anemia  Hgb near baseline. Does have known hemorrhoid but no significant bleeding symptoms.  - no further work up unless bleeding symptoms     Diet: Mechanical/Dental Soft Diet    DVT Prophylaxis: Pneumatic Compression Devices  Robbins Catheter: Not present  Lines: None     Cardiac Monitoring: None  Code Status: No CPR- Pre-arrest intubation OK      Clinically Significant Risk Factors Present on Admission              # Hypoalbuminemia: Lowest albumin = 2.9 g/dL at 1/16/2023 10:34 AM, will monitor as appropriate                Disposition Plan      Expected Discharge Date: 01/31/2023    Discharge Delays: Placement - LTC  *Medically Ready for Discharge            The patient's care was discussed with the Bedside Nurse, Patient and Patient's Family.    Evelyn Mcrae PA-C  Hospitalist Service  [facility]  Securely message with [employer] (more info)  Text page via [employer] Paging/Directory   ______________________________________________________________________    Interval History   Assumed care. Chart reviewed. No concerns overnight per nursing staff or co-guardian Evelyn at bedside.     Physical Exam   Vital Signs: Temp: 98  F (36.7  C) Temp src: Axillary BP: 101/69 Pulse: 70   Resp: 16 SpO2: 97 % O2 Device: None (Room air)    Weight: 0 lbs 0 oz  Patient was sitting in wheelchair waiting for staff to assistant him to the bathroom. Smiled when introducing self and confirming his name. NAD.     Medical Decision Making       25 MINUTES SPENT BY ME on the date of service doing chart review, history, exam, documentation & further activities per the note.      Data   ------------------------- PAST 24 HR DATA REVIEWED -----------------------------------------------

## 2023-01-23 NOTE — PLAN OF CARE
PRIMARY DIAGNOSIS: Placement  OUTPATIENT/OBSERVATION GOALS TO BE MET BEFORE DISCHARGE:  1. ADLs back to baseline: Yes    2. Activity and level of assistance: Up with maximum assistance. Consider SW and/or PT evaluation.     3. Pain status: Pain free.    4. Return to near baseline physical activity: Yes     Discharge Planner Nurse   Safe discharge environment identified: No  Barriers to discharge: Yes       Entered by: Kassandra Guevara RN 01/22/2023 9:50 PM   Pt cooperative with med administration.  Resting in bed. Since his nap Pt has been more communicative (using more words to express himself and ask questions) and more cooperative/agreeable/helpful with cares.  Much less resistant to turning.    Please review provider order for any additional goals.   Nurse to notify provider when observation goals have been met and patient is ready for discharge.Goal Outcome Evaluation:

## 2023-01-23 NOTE — PLAN OF CARE
Physical Therapy Discharge Summary    Reason for therapy discharge:    No further expectations of functional progress.    Progress towards therapy goal(s). See goals on Care Plan in Psychiatric electronic health record for goal details.  Goals not met.  Barriers to achieving goals:   limited tolerance for therapy.    Therapy recommendation(s):    No further therapy is recommended.

## 2023-01-23 NOTE — PLAN OF CARE
"PRIMARY DIAGNOSIS: Gerneralized Weakness/Placement  OUTPATIENT/OBSERVATION GOALS TO BE MET BEFORE DISCHARGE:  1. ADLs back to baseline: Yes    2. Activity and level of assistance: Up with maximum assistance. Total cares     3. Pain status: Pain free; nonverbal cues absent.    4. Return to near baseline physical activity: No     Discharge Planner Nurse   Safe discharge environment identified: No  Barriers to discharge: Yes       Entered by: María Elena Ackerman RN 01/23/2023    Pt alert to self. Nonverbal at baseline.  Will call out loudly, can hear in the halls. Sometimes says \"yes\" if questioned if needed to use bathroom.BSC, lift Ax2. Incontinent of B&B. Applied cream on rectum. Feeds with meals, mechanial soft diet. Awaiting placement. SW following.      Please review provider order for any additional goals. Nurse to notify provider when observation goals have been met and patient is ready for discharge.                    "

## 2023-01-24 PROCEDURE — 250N000013 HC RX MED GY IP 250 OP 250 PS 637: Performed by: PHYSICIAN ASSISTANT

## 2023-01-24 PROCEDURE — G0378 HOSPITAL OBSERVATION PER HR: HCPCS

## 2023-01-24 RX ADMIN — LEVOTHYROXINE SODIUM 25 MCG: 0.03 TABLET ORAL at 09:55

## 2023-01-24 RX ADMIN — DONEPEZIL HYDROCHLORIDE 25 MG: 10 TABLET ORAL at 18:24

## 2023-01-24 RX ADMIN — Medication 1 TABLET: at 09:55

## 2023-01-24 RX ADMIN — GLYCERIN, PETROLATUM, PHENYLEPHRINE HCL, PRAMOXINE HCL: 144; 2.5; 10; 15 CREAM TOPICAL at 18:27

## 2023-01-24 RX ADMIN — CYANOCOBALAMIN TAB 500 MCG 500 MCG: 500 TAB at 09:55

## 2023-01-24 RX ADMIN — SIMVASTATIN 40 MG: 40 TABLET, FILM COATED ORAL at 18:24

## 2023-01-24 RX ADMIN — GLYCERIN, PETROLATUM, PHENYLEPHRINE HCL, PRAMOXINE HCL: 144; 2.5; 10; 15 CREAM TOPICAL at 14:58

## 2023-01-24 RX ADMIN — ALLOPURINOL 200 MG: 100 TABLET ORAL at 09:55

## 2023-01-24 ASSESSMENT — ACTIVITIES OF DAILY LIVING (ADL)
ADLS_ACUITY_SCORE: 51
ADLS_ACUITY_SCORE: 55
ADLS_ACUITY_SCORE: 51
ADLS_ACUITY_SCORE: 55
ADLS_ACUITY_SCORE: 51
ADLS_ACUITY_SCORE: 55
ADLS_ACUITY_SCORE: 51
ADLS_ACUITY_SCORE: 51

## 2023-01-24 NOTE — PLAN OF CARE
PRIMARY DIAGNOSIS: Gerneralized Weakness/Placement  OUTPATIENT/OBSERVATION GOALS TO BE MET BEFORE DISCHARGE:  1. ADLs back to baseline: Yes    2. Activity and level of assistance: Up with maximum assistance. Total cares     3. Pain status: Pain free; nonverbal cues absent.    4. Return to near baseline physical activity: No     Discharge Planner Nurse   Safe discharge environment identified: No  Barriers to discharge: Yes       Entered by: María Elena Ackerman RN 01/23/2023    Pt alert to self. Nonverbal at baseline.  Will moan/groan; can be heard in hallways.  BSC, lift Ax2. Incontinent of B&B. Feeds with meals, mechanial soft diet. Back in bed from sitting in personal wheelchair. Pending placement to  for 1/31; SW following.      Please review provider order for any additional goals. Nurse to notify provider when observation goals have been met and patient is ready for discharge.

## 2023-01-24 NOTE — PLAN OF CARE
PRIMARY DIAGNOSIS: GENERALIZED WEAKNESS, FTT    OUTPATIENT/OBSERVATION GOALS TO BE MET BEFORE DISCHARGE  1. Orthostatic performed: No    2. Tolerating PO medications: Yes    3. Return to near baseline physical activity: Yes    4. Cleared for discharge by consultants (if involved): Yes    Discharge Planner Nurse   Safe discharge environment identified: No  Barriers to discharge: Yes       Entered by: Chelly Yin RN 1/23/2023  Pt alert to self only, nonverbal at baseline. LS diminished, BS active. Pt up with A2 and lift device. No PIV in place. Incontinent of B&B. Pt is an assist with meals. Pt was very agitated during shift report when staff entered the room. Pt continued to yell get out for several minutes. Plan for placement at  on 1/31.  Please review provider order for any additional goals.   Nurse to notify provider when observation goals have been met and patient is ready for discharge.

## 2023-01-24 NOTE — PLAN OF CARE
PRIMARY DIAGNOSIS: GENERALIZED WEAKNESS, FTT    OUTPATIENT/OBSERVATION GOALS TO BE MET BEFORE DISCHARGE  1. Orthostatic performed: No    2. Tolerating PO medications: Yes    3. Return to near baseline physical activity: Yes    4. Cleared for discharge by consultants (if involved): Yes    Discharge Planner Nurse   Safe discharge environment identified: No  Barriers to discharge: Yes       Entered by: Chelly Yin RN 01/24/2023 6:13 AM    Pt alert to self only, nonverbal at baseline. LS diminished, BS active. Pt up with A2 and lift device. No PIV in place. Incontinent of B&B. Pt is an assist with meals. Plan for placement at  on 1/31.  /69 (BP Location: Right arm)   Pulse 70   Temp 98  F (36.7  C) (Axillary)   Resp 16   SpO2 97%   Please review provider order for any additional goals.   Nurse to notify provider when observation goals have been met and patient is ready for discharge.

## 2023-01-24 NOTE — PLAN OF CARE
PRIMARY DIAGNOSIS: GENERALIZED WEAKNESS, FTT    OUTPATIENT/OBSERVATION GOALS TO BE MET BEFORE DISCHARGE  1. Orthostatic performed: No    2. Tolerating PO medications: Yes    3. Return to near baseline physical activity: Yes    4. Cleared for discharge by consultants (if involved): Yes    Discharge Planner Nurse   Safe discharge environment identified: No  Barriers to discharge: Yes       Entered by: Chelly Yin, RN 01/24/2023   Pt alert to self only, nonverbal at baseline. LS diminished, BS active. Pt up with A2 and lift device. No PIV in place. Incontinent of B&B. Pt is an assist with meals. Plan for placement at  on 1/31.  Please review provider order for any additional goals.   Nurse to notify provider when observation goals have been met and patient is ready for discharge.

## 2023-01-24 NOTE — PROGRESS NOTES
[facility]    Medicine Progress Note - Hospitalist Service    Date of Admission:  1/16/2023    Assessment & Plan   Ad Joyner is a 57 year old male with past medical history significant for Down's syndrome with cognitive impairment and memory loss, hypothyroidism, OCD, hearing loss admitted on 1/16/2023 for disposition assistance.      Legal guardians (Evelyn and Mack) have been caring for Mr. Joyner in home for the last 3 years, prior to this resided in a group home. Due to inability to care for the patient in the home, have sought assistance from the ED, hospital. Have been trying to place since end of December as an outpatient but unsuccessful so far.     #Need for disposition assistance  - no acute medical issues   - SW consulted and following  - apparently his guardians have found a new group home that can take him on January 31     #Down's Syndrome    #Cognitive Impairment with Memory Loss, Functional decline in status  At baseline on admit - largely non verbal, used to wheelchair transfer up until the last month, requires assistance with all adl's.  Resistant to medical cares such as vital sign monitoring, lab draws, clinic visits etc.   Currently calm. Does have history of behavior disturbances described as combativeness but no issues with this for years per family.  Cognitive, functional decline since December, 2022.   - resume Donepezil  - as he is a social admission with no acute medical issues will not be drawing routine labs, vital sign monitoring etc. unless there is a change in status  - Bedside attendant at RN discretion  - on a regular diet that is easy to chew due to lack of dentition, needs assistance with all meals and fluid intake  - Family is ok to continue to help with cares ie med administration, meals   - per previous discussions with co - guardian, Evelyn. Restorative, supportive only. Hospice referral for at dismissal placed and Evelyn is aware of this.  He has had significant functional decline. As he is still eating, no acute medical issues, planning to hold off on formal hospice enrollment at this time.      #Macrocytic Anemia   Hgb near baseline. Does have known hemorrhoid but no significant bleeding symptoms.  - no further work up unless bleeding symptoms     Diet: Mechanical/Dental Soft Diet    DVT Prophylaxis: Pneumatic Compression Devices  Robbins Catheter: Not present  Lines: None     Cardiac Monitoring: None  Code Status: No CPR- Pre-arrest intubation OK      Clinically Significant Risk Factors Present on Admission              # Hypoalbuminemia: Lowest albumin = 2.9 g/dL at 1/16/2023 10:34 AM, will monitor as appropriate                Disposition Plan      Expected Discharge Date: 01/31/2023    Discharge Delays: Placement - LTC  *Medically Ready for Discharge            The patient's care was discussed with the Bedside Nurse, Patient and Patient's Family.    Evelyn Mcrae PA-C  Hospitalist Service  [facility]  Securely message with [employer] (more info)  Text page via [employer] Paging/Directory   ______________________________________________________________________    Interval History   Sitting up in wheelchair looking at baseball cards and smiling. Patient's sister visited earlier. Intermittent lower blood pressures due to not wanting to be checked and asymptomatic.     Physical Exam   Vital Signs: Temp: 98.1  F (36.7  C) Temp src: Axillary BP: (!) 83/66 Pulse: 81   Resp: 16 SpO2: 97 % O2 Device: None (Room air)    Weight: 0 lbs 0 oz  Patient sitting in wheelchair, pleasant, interactive, and gave writer high five.    Medical Decision Making       15 MINUTES SPENT BY ME on the date of service doing chart review, history, exam, documentation & further activities per the note.      Data   ------------------------- PAST 24 HR DATA REVIEWED -----------------------------------------------

## 2023-01-25 PROCEDURE — 36415 COLL VENOUS BLD VENIPUNCTURE: CPT | Performed by: PHYSICIAN ASSISTANT

## 2023-01-25 PROCEDURE — 86481 TB AG RESPONSE T-CELL SUSP: CPT | Performed by: PHYSICIAN ASSISTANT

## 2023-01-25 PROCEDURE — G0378 HOSPITAL OBSERVATION PER HR: HCPCS

## 2023-01-25 PROCEDURE — 250N000013 HC RX MED GY IP 250 OP 250 PS 637: Performed by: PHYSICIAN ASSISTANT

## 2023-01-25 PROCEDURE — 99231 SBSQ HOSP IP/OBS SF/LOW 25: CPT | Performed by: PHYSICIAN ASSISTANT

## 2023-01-25 RX ADMIN — Medication 1 TABLET: at 09:41

## 2023-01-25 RX ADMIN — DONEPEZIL HYDROCHLORIDE 25 MG: 10 TABLET ORAL at 16:50

## 2023-01-25 RX ADMIN — LEVOTHYROXINE SODIUM 50 MCG: 0.03 TABLET ORAL at 09:48

## 2023-01-25 RX ADMIN — ALLOPURINOL 200 MG: 100 TABLET ORAL at 09:41

## 2023-01-25 RX ADMIN — SIMVASTATIN 40 MG: 40 TABLET, FILM COATED ORAL at 16:50

## 2023-01-25 RX ADMIN — CYANOCOBALAMIN TAB 500 MCG 500 MCG: 500 TAB at 09:41

## 2023-01-25 RX ADMIN — GLYCERIN, PETROLATUM, PHENYLEPHRINE HCL, PRAMOXINE HCL: 144; 2.5; 10; 15 CREAM TOPICAL at 15:06

## 2023-01-25 RX ADMIN — GLYCERIN, PETROLATUM, PHENYLEPHRINE HCL, PRAMOXINE HCL: 144; 2.5; 10; 15 CREAM TOPICAL at 16:50

## 2023-01-25 RX ADMIN — GLYCERIN, PETROLATUM, PHENYLEPHRINE HCL, PRAMOXINE HCL: 144; 2.5; 10; 15 CREAM TOPICAL at 09:49

## 2023-01-25 ASSESSMENT — ACTIVITIES OF DAILY LIVING (ADL)
ADLS_ACUITY_SCORE: 51
ADLS_ACUITY_SCORE: 55
ADLS_ACUITY_SCORE: 51

## 2023-01-25 NOTE — PROGRESS NOTES
Care Management Follow Up    Expected Discharge Date: 02/01/2023     Concerns to be Addressed: Discharge planning      Patient plan of care discussed at interdisciplinary rounds: Yes    Anticipated Discharge Disposition: [facility]    Patient/family educated on Medicare website which has current facility and service quality ratings:  Yes  Patient/Family in Agreement with the Plan:  Yes    Referrals Placed by CM/SW:  Skilled nursing facility, group home  Private pay costs discussed: Not applicable    Additional Information:  KELLI received email from TEJAL CR ([phone]) at [facility]. She reports that based on when [employer] [place] for funding for ICF , pt will need to be admitted 2/1 instead of 1/31.     She requested H&P, MAR, PT notes, and progress notes be faxed to her at (f) [phone]. This info has been faxed.  also asking for TB test-provider notified and this has been ordered.      faxed physician forms for completion and signature, given to provider.     KELLI will continue to follow.     LUIS M Rea, [facility]   Inpatient Care Coordination  [facility]   [phone]

## 2023-01-25 NOTE — PLAN OF CARE
PRIMARY DIAGNOSIS: FTT/Placement  OUTPATIENT/OBSERVATION GOALS TO BE MET BEFORE DISCHARGE:  1. Pain Status: Pain free.    2. Return to near baseline physical activity: No;total lift    3. Cleared for discharge by consultants (if involved): Yes    Discharge Planner Nurse   Safe discharge environment identified: Yes  Barriers to discharge: Yes       Entered by: María Elena Ackerman RN 01/24/2023    Pt alert to self, nonverbal at baseline. Up with Ax2; lift device. Sitting up in personal wheelchair. Placed back in bed for nap. Incontinent of B&B. Needs assistance for meals.  Mepliex on buttock. Plan for placement discharge at  on 1/31.     Please review provider order for any additional goals. Nurse to notify provider when observation goals have been met and patient is ready for discharge.

## 2023-01-25 NOTE — PLAN OF CARE
PRIMARY DIAGNOSIS: FTT/Placement  OUTPATIENT/OBSERVATION GOALS TO BE MET BEFORE DISCHARGE:  1. Pain Status: Pain free.    2. Return to near baseline physical activity: No;total lift    3. Cleared for discharge by consultants (if involved): Yes    Discharge Planner Nurse   Safe discharge environment identified: Yes  Barriers to discharge: Yes       Entered by: María Elena Ackerman RN 01/24/2023    Pt alert to self, nonverbal at baseline. Up with Ax2; lift device. No PIV. Incontinent of B&B. Needs assistance for meals.  Mepliex on buttock to prevent pressure wound. Plan for placement discharge at  on 1/31.     Please review provider order for any additional goals. Nurse to notify provider when observation goals have been met and patient is ready for discharge.

## 2023-01-25 NOTE — PLAN OF CARE
PRIMARY DIAGNOSIS: GENERALIZED WEAKNESS, FTT    OUTPATIENT/OBSERVATION GOALS TO BE MET BEFORE DISCHARGE  1. Orthostatic performed: No    2. Tolerating PO medications: Yes    3. Return to near baseline physical activity: Yes    4. Cleared for discharge by consultants (if involved): Yes    Discharge Planner Nurse   Safe discharge environment identified: No  Barriers to discharge: Yes       Entered by: Chelly Yin RN 01/24/2023 9:42 PM  Pt alert to self only, nonverbal at baseline. LS diminished, BS active. Pt up with A2 and lift device. No PIV in place. Incontinent of B&B. Pt is an assist with meals, tolerating mechanical soft diet. No indication of pain noted. Plan for pt to tentatively discharge to  on 1/31.  BP (!) 83/66 (BP Location: Left arm)   Pulse 69   Temp 98.1  F (36.7  C) (Axillary)   Resp 16   SpO2 98%   Please review provider order for any additional goals.   Nurse to notify provider when observation goals have been met and patient is ready for discharge.

## 2023-01-25 NOTE — PLAN OF CARE
PRIMARY DIAGNOSIS: Placement   OUTPATIENT/OBSERVATION GOALS TO BE MET BEFORE DISCHARGE:  1. ADLs back to baseline: Yes    2. Activity and level of assistance: Up with maximum assistance. Consider SW and/or PT evaluation.     3. Pain status: Pain free.    4. Return to near baseline physical activity: Yes     Discharge Planner Nurse   Safe discharge environment identified: Yes  Barriers to discharge: Yes       Entered by: Jeanette Medina RN 01/25/2023   Patient is nonverbal. Up with two staff assist using lift. Pt is calm/ cooperative. Incontinent of bladder/ bowel.SW is working on placement. Will continue care.  Please review provider order for any additional goals.   Nurse to notify provider when observation goals have been met and patient is ready for discharge.Goal Outcome Evaluation:

## 2023-01-25 NOTE — PLAN OF CARE
PRIMARY DIAGNOSIS: Placement   OUTPATIENT/OBSERVATION GOALS TO BE MET BEFORE DISCHARGE:  1. ADLs back to baseline: Yes    2. Activity and level of assistance: Up with maximum assistance. Consider SW and/or PT evaluation.     3. Pain status: Pain free.    4. Return to near baseline physical activity: Yes     Discharge Planner Nurse   Safe discharge environment identified: Yes  Barriers to discharge: Yes       Entered by: Jeanette Medina RN 01/25/2023   Patient is nonverbal at basline. Up with two staff assist using lift. Pt is calm/ cooperative. Incontinent of bladder/ bowel.SW is working on placement. Will continue care.  BP 97/69 (BP Location: Right arm)   Pulse 61   Temp 98.6  F (37  C) (Axillary)   Resp 18   SpO2 95%     Please review provider order for any additional goals.   Nurse to notify provider when observation goals have been met and patient is ready for discharge.Goal Outcome Evaluation:

## 2023-01-25 NOTE — PROGRESS NOTES
[facility]    Medicine Progress Note - Hospitalist Service    Date of Admission:  1/16/2023    Assessment & Plan   Ad Joyner is a 57 year old male with past medical history significant for Down's syndrome with cognitive impairment and memory loss, hypothyroidism, OCD, hearing loss admitted on 1/16/2023 for disposition assistance.      Legal guardians (Evelyn and Mack) have been caring for Mr. Joyner in home for the last 3 years, prior to this resided in a group home. Due to inability to care for the patient in the home, have sought assistance from the ED, hospital. Have been trying to place since end of December as an outpatient but unsuccessful so far.     #Need for disposition assistance  - no acute medical issues   - SW consulted and following  - apparently his guardians have found a new group home that can take him on on 2/1 or later  - TB test ordered per group home request      #Down's Syndrome    #Cognitive Impairment with Memory Loss, Functional decline in status  At baseline on admit - largely non verbal, used to wheelchair transfer up until the last month, requires assistance with all adl's.  Resistant to medical cares such as vital sign monitoring, lab draws, clinic visits etc.   Currently calm. Does have history of behavior disturbances described as combativeness but no issues with this for years per family.  Cognitive, functional decline since December, 2022.   - resume Donepezil  - as he is a social admission with no acute medical issues will not be drawing routine labs, vital sign monitoring etc. unless there is a change in status  - Bedside attendant at RN discretion  - on a regular diet that is easy to chew due to lack of dentition, needs assistance with all meals and fluid intake  - Family is ok to continue to help with cares ie med administration, meals   - patient should be up out of bed during the day   - per previous discussions with co - guardian, Evelyn.  Restorative, supportive only. Hospice referral for at dismissal placed and Evelyn is aware of this. He has had significant functional decline. As he is still eating, no acute medical issues, planning to hold off on formal hospice enrollment at this time.      #Macrocytic Anemia   Hgb near baseline. Does have known hemorrhoid but no significant bleeding symptoms.  - no further work up unless bleeding symptoms     Diet: Mechanical/Dental Soft Diet    DVT Prophylaxis: Pneumatic Compression Devices  Robbins Catheter: Not present  Lines: None     Cardiac Monitoring: None  Code Status: No CPR- Pre-arrest intubation OK      Clinically Significant Risk Factors Present on Admission              # Hypoalbuminemia: Lowest albumin = 2.9 g/dL at 1/16/2023 10:34 AM, will monitor as appropriate                Disposition Plan      Expected Discharge Date: 02/01/2023    Discharge Delays: Placement - LTC  *Medically Ready for Discharge            The patient's care was discussed with the Bedside Nurse, Patient and Patient's Family.    Evelyn Mcrae PA-C  Hospitalist Service  [facility]  Securely message with [employer] (more info)  Text page via [employer] Paging/Directory   ______________________________________________________________________    Interval History   Sitting up in wheelchair looking at baseball cards. Patient mildly upset due to just having blood drawn. No events overnight.     Patient's sister and co-guardian Evelyn in room during interview with all questions answered.     Physical Exam   Vital Signs: Temp: 98.6  F (37  C) Temp src: Axillary BP: 97/69 Pulse: 61   Resp: 18 SpO2: 95 % O2 Device: None (Room air)    Weight: 0 lbs 0 oz  General: Patient sitting in wheelchair, pleasant, interactive  CV: RRR, no murmur or rub  Respiratory: CTAB without wheezing or rales  Extremities: 2+ dorsalis pedis pulses bilaterally, no LE edema    Medical Decision Making       25 MINUTES SPENT BY ME on the date of  service doing chart review, history, exam, documentation & further activities per the note.      Data   ------------------------- PAST 24 HR DATA REVIEWED -----------------------------------------------

## 2023-01-25 NOTE — PLAN OF CARE
PRIMARY DIAGNOSIS: GENERALIZED WEAKNESS, FTT    OUTPATIENT/OBSERVATION GOALS TO BE MET BEFORE DISCHARGE  1. Orthostatic performed: No    2. Tolerating PO medications: Yes    3. Return to near baseline physical activity: Yes    4. Cleared for discharge by consultants (if involved): Yes    Discharge Planner Nurse   Safe discharge environment identified: No  Barriers to discharge: Yes       Entered by: Chelly Yin RN 01/25/2023 5:05 AM  Pt alert to self only, nonverbal at baseline. LS diminished, BS active. Pt up with A2 and lift device. No PIV in place. Incontinent of B&B. Pt is an assist with meals, tolerating mechanical soft diet. No indication of pain noted. Plan for pt to tentatively discharge to  on 1/31.  BP (!) 83/66 (BP Location: Left arm)   Pulse 69   Temp 98.1  F (36.7  C) (Axillary)   Resp 16   SpO2 98%   Please review provider order for any additional goals.   Nurse to notify provider when observation goals have been met and patient is ready for discharge.

## 2023-01-26 LAB
QUANTIFERON MITOGEN: 10 IU/ML
QUANTIFERON NIL TUBE: 0.04 IU/ML
QUANTIFERON TB1 TUBE: 0.05 IU/ML
QUANTIFERON TB2 TUBE: 0.04

## 2023-01-26 PROCEDURE — G0378 HOSPITAL OBSERVATION PER HR: HCPCS

## 2023-01-26 PROCEDURE — 99232 SBSQ HOSP IP/OBS MODERATE 35: CPT | Performed by: PHYSICIAN ASSISTANT

## 2023-01-26 PROCEDURE — 250N000013 HC RX MED GY IP 250 OP 250 PS 637: Performed by: PHYSICIAN ASSISTANT

## 2023-01-26 RX ADMIN — ALLOPURINOL 200 MG: 100 TABLET ORAL at 09:10

## 2023-01-26 RX ADMIN — GLYCERIN, PETROLATUM, PHENYLEPHRINE HCL, PRAMOXINE HCL: 144; 2.5; 10; 15 CREAM TOPICAL at 17:05

## 2023-01-26 RX ADMIN — CYANOCOBALAMIN TAB 500 MCG 500 MCG: 500 TAB at 09:11

## 2023-01-26 RX ADMIN — DONEPEZIL HYDROCHLORIDE 25 MG: 10 TABLET ORAL at 17:04

## 2023-01-26 RX ADMIN — LEVOTHYROXINE SODIUM 25 MCG: 0.03 TABLET ORAL at 09:13

## 2023-01-26 RX ADMIN — GLYCERIN, PETROLATUM, PHENYLEPHRINE HCL, PRAMOXINE HCL: 144; 2.5; 10; 15 CREAM TOPICAL at 09:11

## 2023-01-26 RX ADMIN — Medication 1 TABLET: at 09:11

## 2023-01-26 RX ADMIN — SIMVASTATIN 40 MG: 40 TABLET, FILM COATED ORAL at 17:05

## 2023-01-26 ASSESSMENT — ACTIVITIES OF DAILY LIVING (ADL)
ADLS_ACUITY_SCORE: 55
ADLS_ACUITY_SCORE: 47
ADLS_ACUITY_SCORE: 55

## 2023-01-26 NOTE — PROGRESS NOTES
Care Management Follow Up      Expected Discharge Date: 02/01/2023     Concerns to be Addressed: Discharge planning      Patient plan of care discussed at interdisciplinary rounds: Yes    Anticipated Discharge Disposition: [facility]      Patient/Family in Agreement with the Plan:  Yes    Additional Information:  SW faxed H&P, PT notes, MAR, etc to [facility], (F) [phone].     Address for : [facility] [address]. Phone # for : [phone]. Facility requesting that all medications be sent to [facility] to be filled (Phone#: [phone] and Fax number is [phone].)     requesting that medical transport be arranged for 11am on 2/1 for admission to . Group Home DON, Waiver CM, and guardian coordinating completing paperwork, funding, etc.    SW will continue to follow.     LUIS M Rea, [facility]   Inpatient Care Coordination  [facility]   [phone]

## 2023-01-26 NOTE — PROGRESS NOTES
PRIMARY DIAGNOSIS: Placement  OUTPATIENT/OBSERVATION GOALS TO BE MET BEFORE DISCHARGE:  1. ADLs back to baseline: Yes    Activity and level of assistance: Up with maximum assistance.   2. Pain status: Pain free.    3. Return to near baseline physical activity: Yes     Discharge Planner Nurse   Safe discharge environment identified: Yes  Barriers to discharge: Yes       Entered by: Quita Baer RN 01/25/2023     Pt is nonverbal. Pt back back to bed with 2 staff and lift. Pt is calm and cooperative. SW working on placement. Will continue to monitor.   Please review provider order for any additional goals.   Nurse to notify provider when observation goals have been met and patient is ready for discharge.

## 2023-01-26 NOTE — PROGRESS NOTES
PRIMARY DIAGNOSIS: Placement  OUTPATIENT/OBSERVATION GOALS TO BE MET BEFORE DISCHARGE:  1. ADLs back to baseline: Yes    Activity and level of assistance: Up with maximum assistance.   2. Pain status: Pain free.    3. Return to near baseline physical activity: Yes     Discharge Planner Nurse   Safe discharge environment identified: Yes  Barriers to discharge: Yes       Entered by: Quita Baer RN 01/26/2023      Pt is sleeping in bed comfortably. SW working on placement. Will continue to monitor.   Please review provider order for any additional goals.   Nurse to notify provider when observation goals have been met and patient is ready for discharge.

## 2023-01-26 NOTE — PROGRESS NOTES
[facility]  Hospitalist Progress Note  Taya Viveros PA-C 01/26/2023           Assessment and Plan:      Ad Joyner is a 57 year old male with past medical history significant for Down's syndrome with cognitive impairment and memory loss, hypothyroidism, OCD, hearing loss admitted on 1/16/2023 for disposition assistance.      Legal guardians (Evelyn and Mack) have been caring for Mr. Joyner in home for the last 3 years, prior to this resided in a group home. Due to inability to care for the patient in the home, have sought assistance from the ED, hospital. Have been trying to place since end of December as an outpatient but unsuccessful so far.      #Need for disposition assistance  - no acute medical issues   - SW consulted and following  - apparently his guardians have found a new group home that can take him on on 2/1 or later  - TB test ordered per group home request      #Down's Syndrome    #Cognitive Impairment with Memory Loss, Functional decline in status  At baseline on admit - largely non verbal, used to wheelchair transfer up until the last month, requires assistance with all adl's.  Resistant to medical cares such as vital sign monitoring, lab draws, clinic visits etc.   Currently calm. Does have history of behavior disturbances described as combativeness but no issues with this for years per family.  Cognitive, functional decline since December, 2022.   - resume Donepezil  - as he is a social admission with no acute medical issues will not be drawing routine labs, vital sign monitoring etc. unless there is a change in status  - Bedside attendant at RN discretion  - on a regular diet that is easy to chew due to lack of dentition, needs assistance with all meals and fluid intake  - Family is ok to continue to help with cares ie med administration, meals   - patient should be up out of bed during the day   - per previous discussions with co - guardian, Evelyn. Restorative,  supportive only. Hospice referral for at dismissal placed and Evelyn is aware of this. He has had significant functional decline. As he is still eating, no acute medical issues, planning to hold off on formal hospice enrollment at this time.      #Macrocytic Anemia   Hgb near baseline. Does have known hemorrhoid but no significant bleeding symptoms.  - no further work up unless bleeding symptoms        Diet: Mechanical/Dental Soft Diet    DVT Prophylaxis: Pneumatic Compression Devices  Robbins Catheter: Not present  Lines: None     Cardiac Monitoring: None  Code Status: No CPR- Pre-arrest intubation OK             Interval History (Subjective):      No new issue today.   Was not able to meet the sister today.                   Physical Exam:      Last Vital Signs:  BP 97/69 (BP Location: Right arm)   Pulse 61   Temp 98.6  F (37  C) (Axillary)   Resp 18   SpO2 95%       Constitutional: Awake, alert, cooperative, no apparent distress   Respiratory: Clear to auscultation bilaterally, no crackles or wheezing   Cardiovascular: Regular rate and rhythm, normal S1 and S2, and no murmur noted   Abdomen: Normal bowel sounds, soft, non-distended, non-tender   Skin: No rashes, no cyanosis, dry to touch   Neuro: Alert and oriented x3, no weakness, numbness, memory loss   Extremities: No edema, normal range of motion   Other(s):        All other systems: Negative          Medications:      All current medications were reviewed with changes reflected in problem list.         Data:      All new lab and imaging data was reviewed.   Labs:       Lab Results   Component Value Date     01/16/2023     11/25/2015     09/11/2013    .0 08/16/2011    Lab Results   Component Value Date    CHLORIDE 102 01/16/2023    CHLORIDE 103 11/25/2015    CHLORIDE 104 09/11/2013    CHLORIDE 103.0 08/16/2011    Lab Results   Component Value Date    BUN 8.9 01/16/2023    BUN 16 11/25/2015    BUN 20 09/11/2013    BUN 17.0  08/16/2011    BUN 15.5 08/16/2011      Lab Results   Component Value Date    POTASSIUM 3.8 01/16/2023    POTASSIUM 3.5 11/25/2015    POTASSIUM 3.8 09/11/2013    POTASSIUM 4.0 08/16/2011    Lab Results   Component Value Date    CO2 28 01/16/2023    CO2 26 11/25/2015    CO2 28 09/11/2013    CO2 28.7 08/16/2011    Lab Results   Component Value Date    CR 0.86 01/16/2023    CR 0.90 11/25/2015    CR 1.10 09/11/2013    CR 1.1 08/22/2012        No results for input(s): WBC, HGB, HCT, MCV, PLT in the last 168 hours.   Imaging:   Results for orders placed or performed during the hospital encounter of 11/25/15   CT Abdomen Pelvis w Contrast    Addendum: 12/10/2015    Ad Joyner   Accession # [id number]    The original report on this patient was dictated by Spenser boggs M.D..      Outside CT chest 8/17/15 available for comparison.    The infiltrate in the right lung base seen on 11/26/15 was not present  on 8/17/15 and may be secondary to pneumonia. The fluid stranding  about the gallbladder and inferior margin of the liver seen on  11/26/15 was not present on 8/17/15. The pulmonary cyst in the left  lower lobe is unchanged.    Ravin Xie M.D. ( Date of Addendum: 12/10/15 )    RAVIN XIE MD      Narrative    CT ABDOMEN PELVIS W CONTRAST  11/26/2015 12:52 AM      HISTORY: Right abdominal pain.    TECHNIQUE: CT abdomen and pelvis with intravenous contrast. 75 mL  Isovue-370.     COMPARISON: None.    FINDINGS:    Abdomen: There is right basilar infiltrate which is likely pneumonia.  There is a pulmonary cyst in the left lower lobe. The heart size is  normal. The liver, spleen, pancreas, adrenal glands and right kidney  are normal in appearance. There is cortical scarring in the left  kidney. There may be mild fluid stranding about the gallbladder and  the inferior margin of the liver. No calcified gallstones. There is no  abdominal or pelvic lymph node enlargement.    Pelvis: Bowel appears normal without  obstruction or inflammation. The  appendix is not seen. No evidence of appendicitis. No free  intraperitoneal gas or fluid. Mild degenerative disease in the spine.      Impression    IMPRESSION:  1. There is a right basilar infiltrate which may be pneumonia.   2. Mild fluid stranding in the fat about the gallbladder and inferior  margin of the liver. There are no calcified gallstones. If there is  clinical concern for biliary disease, ultrasound is recommended.  3. No bowel obstruction or inflammation.    ANSLEY HALEY MD   US Abdomen Limited    Narrative    US ABDOMEN LIMITED  11/26/2015 2:47 AM      HISTORY: Right upper quadrant pain. Right upper quadrant inflammation  on CT.     COMPARISON: CT 11/26/2015.    FINDINGS: The liver is normal in size and texture without focal mass.  There is no intra- or extrahepatic biliary dilatation. The common  hepatic duct measures 0.4 cm. The gallbladder is normal in appearance  without gallstones. No gallbladder wall thickening. No pericholecystic  fluid. The pancreas is nearly completely obscured by bowel gas.  The  right kidney measures 9.8 cm and is normal in appearance. The proximal  abdominal aorta and IVC appear normal.       Impression    IMPRESSION: Normal right upper quadrant. No gallstone or biliary  dilatation.    ANSLEY HALEY MD   XR Chest 1 View    Narrative    XR CHEST 1 VW  11/26/2015 2:22 AM      HISTORY: Chest and abdominal pain.     COMPARISON: None.    FINDINGS: The heart is enlarged. There is pulmonary vascular  congestion but not definite pulmonary edema. There is a right basilar  infiltrate, atelectasis versus pneumonia. Left lung appears clear.      Impression    IMPRESSION:  1. Cardiomegaly and pulmonary vascular congestion.   2. Right basilar infiltrate.    ANSLEY HALEY MD

## 2023-01-26 NOTE — PLAN OF CARE
PRIMARY DIAGNOSIS: PLACEMENT  OUTPATIENT/OBSERVATION GOALS TO BE MET BEFORE DISCHARGE:  1. ADLs back to baseline: Yes, total assist at baseline    2. Activity and level of assistance: Total assist    3. Pain status: Pain free.    4. Return to near baseline physical activity: Yes     Discharge Planner Nurse   Safe discharge environment identified: No  Barriers to discharge: Yes       Entered by: Janet Horan RN 01/26/2023 5:56 PM    Only oriented to self. Total assist at baseline. Incontinent of B/B, changed pt x2 this shift. Pt non-verbal. Feeder, ate ~50% of meal. Family in the room visiting. Medications given per orders. Refused vitals.  SW: [facility] on 2/1 at 11am -- ride needs to be setup.  Please review provider order for any additional goals.   Nurse to notify provider when observation goals have been met and patient is ready for discharge.

## 2023-01-27 LAB
GAMMA INTERFERON BACKGROUND BLD IA-ACNC: 0.04 IU/ML
M TB IFN-G BLD-IMP: NEGATIVE
M TB IFN-G CD4+ BCKGRND COR BLD-ACNC: 9.96 IU/ML
MITOGEN IGNF BCKGRD COR BLD-ACNC: 0 IU/ML
MITOGEN IGNF BCKGRD COR BLD-ACNC: 0.01 IU/ML

## 2023-01-27 PROCEDURE — G0378 HOSPITAL OBSERVATION PER HR: HCPCS

## 2023-01-27 PROCEDURE — 250N000013 HC RX MED GY IP 250 OP 250 PS 637: Performed by: PHYSICIAN ASSISTANT

## 2023-01-27 PROCEDURE — 99233 SBSQ HOSP IP/OBS HIGH 50: CPT | Performed by: PHYSICIAN ASSISTANT

## 2023-01-27 RX ADMIN — Medication 1 TABLET: at 09:14

## 2023-01-27 RX ADMIN — GLYCERIN, PETROLATUM, PHENYLEPHRINE HCL, PRAMOXINE HCL: 144; 2.5; 10; 15 CREAM TOPICAL at 15:22

## 2023-01-27 RX ADMIN — GLYCERIN, PETROLATUM, PHENYLEPHRINE HCL, PRAMOXINE HCL: 144; 2.5; 10; 15 CREAM TOPICAL at 17:49

## 2023-01-27 RX ADMIN — CYANOCOBALAMIN TAB 500 MCG 500 MCG: 500 TAB at 09:14

## 2023-01-27 RX ADMIN — DONEPEZIL HYDROCHLORIDE 25 MG: 10 TABLET ORAL at 17:49

## 2023-01-27 RX ADMIN — SIMVASTATIN 40 MG: 40 TABLET, FILM COATED ORAL at 17:49

## 2023-01-27 RX ADMIN — LEVOTHYROXINE SODIUM 25 MCG: 0.03 TABLET ORAL at 09:14

## 2023-01-27 RX ADMIN — ALLOPURINOL 200 MG: 100 TABLET ORAL at 09:14

## 2023-01-27 ASSESSMENT — ACTIVITIES OF DAILY LIVING (ADL)
ADLS_ACUITY_SCORE: 47
ADLS_ACUITY_SCORE: 55
ADLS_ACUITY_SCORE: 55
ADLS_ACUITY_SCORE: 47
ADLS_ACUITY_SCORE: 47
ADLS_ACUITY_SCORE: 55
ADLS_ACUITY_SCORE: 55

## 2023-01-27 NOTE — PLAN OF CARE
Goal Outcome Evaluation:               PRIMARY DIAGNOSIS: placement  OUTPATIENT/OBSERVATION GOALS TO BE MET BEFORE DISCHARGE:  1. ADLs back to baseline: Yes, assist of 2 pivot     2. Activity and level of assistance: up with assist of 2     3. Pain status: Pain free.     4. Return to near baseline physical activity: Yes          Discharge Planner Nurse   Safe discharge environment identified: No  Barriers to discharge: Yes         Please review provider order for any additional goals.   Nurse to notify provider when observation goals have been met and patient is ready for discharge.Goal Outcome Evaluation:     Pt sleeping between cares, awaiting placement, 2/1

## 2023-01-27 NOTE — PROGRESS NOTES
[facility]  Hospitalist Progress Note  Taya Viveros PA-C 01/27/2023         Assessment and Plan:      Ad Joyner is a 57 year old male with past medical history significant for Down's syndrome with cognitive impairment and memory loss, hypothyroidism, OCD, hearing loss admitted on 1/16/2023 for disposition assistance.      Legal guardians (Evelyn and Mack) have been caring for Mr. Joyner in home for the last 3 years, prior to this resided in a group home. Due to inability to care for the patient in the home, have sought assistance from the ED, hospital. Have been trying to place since end of December as an outpatient but unsuccessful so far.      #Need for disposition assistance  - no acute medical issues   - SW consulted and following  - apparently his guardians have found a new group home that can take him on on 2/1 or later  - TB test ordered per group home request      #Down's Syndrome    #Cognitive Impairment with Memory Loss, Functional decline in status  At baseline on admit - largely non verbal, used to wheelchair transfer up until the last month, requires assistance with all adl's.  Resistant to medical cares such as vital sign monitoring, lab draws, clinic visits etc.   Currently calm. Does have history of behavior disturbances described as combativeness but no issues with this for years per family.  Cognitive, functional decline since December, 2022.   - resume Donepezil  - as he is a social admission with no acute medical issues will not be drawing routine labs, vital sign monitoring etc. unless there is a change in status  - Bedside attendant at RN discretion  - on a regular diet that is easy to chew due to lack of dentition, needs assistance with all meals and fluid intake  - Family is ok to continue to help with cares ie med administration, meals   - patient should be up out of bed during the day   - per previous discussions with co - guardian, Evelyn. Restorative,  supportive only. Hospice referral for at dismissal placed and Evelyn is aware of this. He has had significant functional decline. As he is still eating, no acute medical issues, planning to hold off on formal hospice enrollment at this time.      #Macrocytic Anemia   Hgb near baseline. Does have known hemorrhoid but no significant bleeding symptoms.  - no further work up unless bleeding symptoms        Diet: Mechanical/Dental Soft Diet    DVT Prophylaxis: Pneumatic Compression Devices  Robbins Catheter: Not present  Lines: None     Cardiac Monitoring: None  Code Status: No CPR- Pre-arrest intubation OK             Interval History (Subjective):      No new issues today. Sitting in wheelchair watching TV.                   Physical Exam:      Last Vital Signs:  BP 93/51 (BP Location: Left arm)   Pulse 67   Temp 98.6  F (37  C) (Axillary)   Resp 18   SpO2 97%       Constitutional: Awake, alert, cooperative, no apparent distress   Respiratory: Clear to auscultation bilaterally, no crackles or wheezing   Cardiovascular: Regular rate and rhythm, normal S1 and S2, and no murmur noted   Abdomen: Normal bowel sounds, soft, non-distended, non-tender   Skin: No rashes, no cyanosis, dry to touch   Neuro: Alert and oriented x3, no weakness, numbness, memory loss   Extremities: No edema, normal range of motion   Other(s):        All other systems: Negative          Medications:      All current medications were reviewed with changes reflected in problem list.         Data:      All new lab and imaging data was reviewed.   Labs:       Lab Results   Component Value Date     01/16/2023     11/25/2015     09/11/2013    .0 08/16/2011    Lab Results   Component Value Date    CHLORIDE 102 01/16/2023    CHLORIDE 103 11/25/2015    CHLORIDE 104 09/11/2013    CHLORIDE 103.0 08/16/2011    Lab Results   Component Value Date    BUN 8.9 01/16/2023    BUN 16 11/25/2015    BUN 20 09/11/2013    BUN 17.0 08/16/2011     BUN 15.5 08/16/2011      Lab Results   Component Value Date    POTASSIUM 3.8 01/16/2023    POTASSIUM 3.5 11/25/2015    POTASSIUM 3.8 09/11/2013    POTASSIUM 4.0 08/16/2011    Lab Results   Component Value Date    CO2 28 01/16/2023    CO2 26 11/25/2015    CO2 28 09/11/2013    CO2 28.7 08/16/2011    Lab Results   Component Value Date    CR 0.86 01/16/2023    CR 0.90 11/25/2015    CR 1.10 09/11/2013    CR 1.1 08/22/2012          Imaging:   Results for orders placed or performed during the hospital encounter of 11/25/15   CT Abdomen Pelvis w Contrast    Addendum: 12/10/2015    Ad Joyner   Accession # [id number]    The original report on this patient was dictated by Spenser boggs M.D..      Outside CT chest 8/17/15 available for comparison.    The infiltrate in the right lung base seen on 11/26/15 was not present  on 8/17/15 and may be secondary to pneumonia. The fluid stranding  about the gallbladder and inferior margin of the liver seen on  11/26/15 was not present on 8/17/15. The pulmonary cyst in the left  lower lobe is unchanged.    Ravin iXe M.D. ( Date of Addendum: 12/10/15 )    RAVIN XIE MD      Narrative    CT ABDOMEN PELVIS W CONTRAST  11/26/2015 12:52 AM      HISTORY: Right abdominal pain.    TECHNIQUE: CT abdomen and pelvis with intravenous contrast. 75 mL  Isovue-370.     COMPARISON: None.    FINDINGS:    Abdomen: There is right basilar infiltrate which is likely pneumonia.  There is a pulmonary cyst in the left lower lobe. The heart size is  normal. The liver, spleen, pancreas, adrenal glands and right kidney  are normal in appearance. There is cortical scarring in the left  kidney. There may be mild fluid stranding about the gallbladder and  the inferior margin of the liver. No calcified gallstones. There is no  abdominal or pelvic lymph node enlargement.    Pelvis: Bowel appears normal without obstruction or inflammation. The  appendix is not seen. No evidence of appendicitis. No  free  intraperitoneal gas or fluid. Mild degenerative disease in the spine.      Impression    IMPRESSION:  1. There is a right basilar infiltrate which may be pneumonia.   2. Mild fluid stranding in the fat about the gallbladder and inferior  margin of the liver. There are no calcified gallstones. If there is  clinical concern for biliary disease, ultrasound is recommended.  3. No bowel obstruction or inflammation.    ANSLEY HALEY MD   US Abdomen Limited    Narrative    US ABDOMEN LIMITED  11/26/2015 2:47 AM      HISTORY: Right upper quadrant pain. Right upper quadrant inflammation  on CT.     COMPARISON: CT 11/26/2015.    FINDINGS: The liver is normal in size and texture without focal mass.  There is no intra- or extrahepatic biliary dilatation. The common  hepatic duct measures 0.4 cm. The gallbladder is normal in appearance  without gallstones. No gallbladder wall thickening. No pericholecystic  fluid. The pancreas is nearly completely obscured by bowel gas.  The  right kidney measures 9.8 cm and is normal in appearance. The proximal  abdominal aorta and IVC appear normal.       Impression    IMPRESSION: Normal right upper quadrant. No gallstone or biliary  dilatation.    ANSLEY HALEY MD   XR Chest 1 View    Narrative    XR CHEST 1 VW  11/26/2015 2:22 AM      HISTORY: Chest and abdominal pain.     COMPARISON: None.    FINDINGS: The heart is enlarged. There is pulmonary vascular  congestion but not definite pulmonary edema. There is a right basilar  infiltrate, atelectasis versus pneumonia. Left lung appears clear.      Impression    IMPRESSION:  1. Cardiomegaly and pulmonary vascular congestion.   2. Right basilar infiltrate.    ANSLEY HALEY MD

## 2023-01-27 NOTE — PROGRESS NOTES
"Care Management Follow Up    Expected Discharge Date: 02/01/2023     Concerns to be Addressed:  Discharge planning     Patient plan of care discussed at interdisciplinary rounds: Yes    Anticipated Discharge Disposition: [facility]    Additional Information:  SW received email from GH manager and pt's CM asking that hospital assist with getting patient a custom fitted WC. SW spoke with rehab lead, who provided information for OP WC fitting clinic and phone number to schedule appt.     SW received email from pt's LINDY Linder that she has spoken to [facility] and they can swap out pt's WC for a more appropriate size if order is faxed.     SW placed call to [facility] ([phone]), spoke with L[place]to. They need an order for a manual WC that specifies that it is pediatric size (14x14 instead of the adult 16x16 that pt currently has). Order needs to specify diagnosis and length of need (lifetime). Order must also include pt's height and weight (4'9\" and 130lbs). Order to be faxed to [facility] at (f) [phone].    Paged provider requesting order. SW will continue to follow.     1545: Orders faxed to [facility] (f) [phone]. Copy of DME script emailed to .    LUIS M Rea, [facility]   Inpatient Care Coordination  [facility]   [phone]        "

## 2023-01-27 NOTE — PLAN OF CARE
PRIMARY DIAGNOSIS: Placement   OUTPATIENT/OBSERVATION GOALS TO BE MET BEFORE DISCHARGE:  1. ADLs back to baseline: Yes    2. Activity and level of assistance: Up with maximum assistance, baseline     3. Pain status: Pain free.    4. Return to near baseline physical activity: Yes     Discharge Planner Nurse   Safe discharge environment identified: Yes  Barriers to discharge: Yes       Entered by: Angela Mars RN 01/27/2023    Please review provider order for any additional goals.   Nurse to notify provider when observation goals have been met and patient is ready for discharge.    Alert to self. Total care, baseline. No IV. Incontinent of bowel & bladder- up to commode & brief changed in bed. Pt-non verbal. Good appetite. Takes meds with applesauce. Medically cleared, plan for placement at [facility] 2/1.

## 2023-01-27 NOTE — PLAN OF CARE
PRIMARY DIAGNOSIS: placement  OUTPATIENT/OBSERVATION GOALS TO BE MET BEFORE DISCHARGE:  ADLs back to baseline: Yes, assist of 2 pivot    Activity and level of assistance: up with assist of 2    Pain status: Pain free.    Return to near baseline physical activity: Yes     Discharge Planner Nurse   Safe discharge environment identified: No  Barriers to discharge: Yes       Entered by: Taya Hoffman RN 01/27/2023 1:18 AM     Please review provider order for any additional goals.   Nurse to notify provider when observation goals have been met and patient is ready for discharge.Goal Outcome Evaluation:         Pt sleeping between cares, awaiting placement, 2/1

## 2023-01-27 NOTE — PROGRESS NOTES
PRIMARY DIAGNOSIS: GENERALIZED WEAKNESS/FTT/PLACEMENT    OBSERVATION GOALS TO BE MET BEFORE DISCHARGE  1. Orthostatic performed: N/A    2. Tolerating PO medications: Yes, crush med/Apple sauce    3. Return to near baseline physical activity: Yes, assist x 2 with lift device    4. Cleared for discharge by consultants (if involved): Yes    Discharge Planner Nurse   Safe discharge environment identified: No  Barriers to discharge: Yes       Entered by: Fiordaliza Saba RN 01/26/2023       Pt alert, JONO orientation d/t pt nonverbal. Lung sounds CTA, bowel sounds active. LBM 1/26. Pt exhibits no noneverbal signs of pain, no SOB noted, sats 95% on RA. Pt is assist of 2 with device lift.  Pt noted with good appetite, consumed 100 of dinner. Medically cleared, discharging to [facility] on Feb/1 at 11 AM. Now in bed sleeping. PT and SW following. Will continue to monitor.    BP 97/69 (BP Location: Right arm)   Pulse 61   Temp 98.6  F (37  C) (Axillary)   Resp 18   SpO2 95%     Please review provider order for any additional goals.   Nurse to notify provider when observation goals have been met and patient is ready for discharge.

## 2023-01-28 PROCEDURE — 99231 SBSQ HOSP IP/OBS SF/LOW 25: CPT | Performed by: PHYSICIAN ASSISTANT

## 2023-01-28 PROCEDURE — G0378 HOSPITAL OBSERVATION PER HR: HCPCS

## 2023-01-28 PROCEDURE — 250N000013 HC RX MED GY IP 250 OP 250 PS 637: Performed by: PHYSICIAN ASSISTANT

## 2023-01-28 RX ADMIN — Medication 1 TABLET: at 09:19

## 2023-01-28 RX ADMIN — CYANOCOBALAMIN TAB 500 MCG 500 MCG: 500 TAB at 09:19

## 2023-01-28 RX ADMIN — GLYCERIN, PETROLATUM, PHENYLEPHRINE HCL, PRAMOXINE HCL: 144; 2.5; 10; 15 CREAM TOPICAL at 09:19

## 2023-01-28 RX ADMIN — LEVOTHYROXINE SODIUM 50 MCG: 0.03 TABLET ORAL at 09:19

## 2023-01-28 RX ADMIN — SIMVASTATIN 40 MG: 40 TABLET, FILM COATED ORAL at 17:19

## 2023-01-28 RX ADMIN — ALLOPURINOL 200 MG: 100 TABLET ORAL at 09:19

## 2023-01-28 RX ADMIN — DONEPEZIL HYDROCHLORIDE 25 MG: 10 TABLET ORAL at 17:19

## 2023-01-28 ASSESSMENT — ACTIVITIES OF DAILY LIVING (ADL)
ADLS_ACUITY_SCORE: 55

## 2023-01-28 NOTE — PLAN OF CARE
BP 93/51 (BP Location: Left arm)   Pulse 67   Temp 98.6  F (37  C) (Axillary)   Resp 18   SpO2 97%      PRIMARY DIAGNOSIS: GENERALIZED WEAKNESS    OUTPATIENT/OBSERVATION GOALS TO BE MET BEFORE DISCHARGE  1. Orthostatic performed: No    2. Tolerating PO medications: Yes    3. Return to near baseline physical activity: Yes    4. Cleared for discharge by consultants (if involved): Yes    Discharge Planner Nurse   Safe discharge environment identified: Yes  Barriers to discharge: Yes    Patient is alert to self only. Disoriented x 3. Denies pain. A x 2 with Lift. Nonverbal. Incontinent of both BM and Urine.  No PIV. Possible discharge to [facility] 2/1. Resting in bed. Will continue to monitor cares.            Entered by: Celeste Yancey RN 01/28/2023 4:23 AM     Please review provider order for any additional goals.   Nurse to notify provider when observation goals have been met and patient is ready for discharge.

## 2023-01-28 NOTE — PLAN OF CARE
PRIMARY DIAGNOSIS: GENERAL WEAKNESS  BP 93/51 (BP Location: Left arm)   Pulse 67   Temp 98.6  F (37  C) (Axillary)   Resp 18   SpO2 97%    OUTPATIENT/OBSERVATION GOALS TO BE MET BEFORE DISCHARGE:  1. Pain Status: Pt. Denies pain    2. Return to near baseline physical activity: Yes    3. Cleared for discharge by consultants (if involved): Yes    Discharge Planner Nurse   Safe discharge environment identified: Yes  Barriers to discharge: Yes    Pt. Is alert and oriented to self. Disoriented to place, time and situation. Nonverbal. Incontinent. AX2 with lift. Unable to complete task when asked. Will continue to monitor.          Entered by: Celeste Yancey RN 01/27/2023 9:16 PM     Please review provider order for any additional goals.   Nurse to notify provider when observation goals have been met and patient is ready for discharge.

## 2023-01-28 NOTE — PLAN OF CARE
PRIMARY DIAGNOSIS: Placement   OUTPATIENT/OBSERVATION GOALS TO BE MET BEFORE DISCHARGE:  1. ADLs back to baseline: Yes    2. Activity and level of assistance: Up with maximum assistance, baseline     3. Pain status: Pain free.    4. Return to near baseline physical activity: Yes     Discharge Planner Nurse   Safe discharge environment identified: Yes  Barriers to discharge: Yes       Entered by: Angela Mars RN 01/27/2023    Please review provider order for any additional goals.   Nurse to notify provider when observation goals have been met and patient is ready for discharge.    Alert to self. Total care, baseline. No IV. Incontinent of bowel & bladder- up to commode & brief changed in bed. Pt-non verbal. Good appetite. Takes meds with applesauce. Medically cleared, plan for placement at [facility] 2/1. Currently in chair, family at bedside.

## 2023-01-28 NOTE — PROGRESS NOTES
[facility]    Hospitalist Progress Note      Assessment & Plan   Ad Joyner is a 57 year old male with past medical history significant for Down's syndrome with cognitive impairment and memory loss, hypothyroidism, OCD, hearing loss admitted on 1/16/2023 for disposition assistance.      Legal guardians (Evelyn and Mack) have been caring for Mr. Joyner in home for the last 3 years, prior to this resided in a group home. Due to inability to care for the patient in the home, have sought assistance from the ED, hospital. Have been trying to place since end of December as an outpatient but unsuccessful so far.      #Need for disposition assistance  - no acute medical issues   - SW consulted and following  - apparently his guardians have found a new group home that can take him on on 2/1 or later  - TB test ordered per group home request      #Down's Syndrome    #Cognitive Impairment with Memory Loss, Functional decline in status  At baseline on admit - largely non verbal, used to wheelchair transfer up until the last month, requires assistance with all adl's.  Resistant to medical cares such as vital sign monitoring, lab draws, clinic visits etc.   Currently calm. Does have history of behavior disturbances described as combativeness but no issues with this for years per family.  Cognitive, functional decline since December, 2022.   - resume Donepezil  - as he is a social admission with no acute medical issues will not be drawing routine labs, vital sign monitoring etc. unless there is a change in status  - Bedside attendant at RN discretion  - on a regular diet that is easy to chew due to lack of dentition, needs assistance with all meals and fluid intake  - Family is ok to continue to help with cares ie med administration, meals   - patient should be up out of bed during the day   - per previous discussions with co - guardian, Evelyn. Restorative, supportive only. Hospice referral for  at dismissal [facility] is aware of this. He has had significant functional decline. As he is still eating, no acute medical issues, planning to hold off on formal hospice enrollment at this time.      #Macrocytic Anemia   Hgb near baseline. Does have known hemorrhoid but no significant bleeding symptoms.  - no further work up unless bleeding symptoms        Diet: Mechanical/Dental Soft Diet    DVT Prophylaxis: Pneumatic Compression Devices  Robbins Catheter: Not present  Lines: None     Cardiac Monitoring: None  Code Status: No CPR- Pre-arrest intubation OK         Disposition:  2/1.    Kiersten Schofield PA-C      Interval History   Sitting up in wheelchair looking at baseball cards. No acute events overnight.    -Data reviewed today: I reviewed all new labs and imaging results over the last 24 hours.    Physical Exam         Pulse: 61     SpO2: 96 % O2 Device: None (Room air)    There were no vitals filed for this visit.  Vital Signs with Ranges  Pulse:  [61] 61  SpO2:  [96 %] 96 %  I/O last 3 completed shifts:  In: 480 [P.O.:480]  Out: -       Constitutional: Awake, alert, no apparent distress  Respiratory:  Normal work of breathing.   Skin/Integument: No rashes, no cyanosis, no peripheral edema.  Neuro: Alert.  Psych: Appropriate affect.             Medications       allopurinol  200 mg Oral Daily     calcium carbonate-vitamin D  1 tablet Oral Daily     cyanocobalamin  500 mcg Oral Daily     donepezil  25 mg Oral Daily with supper     levothyroxine  25 mcg Oral Once per day on Mon Tue Thu Fri     levothyroxine  50 mcg Oral Once per day on Sun Wed Sat     pramox-pe-glycerin-petrolatum   Rectal TID     simvastatin  40 mg Oral Daily with supper       Data   No lab results found in last 7 days.    No results found for this or any previous visit (from the past 24 hour(s)).

## 2023-01-28 NOTE — PLAN OF CARE
PRIMARY DIAGNOSIS: PlACMENT  OUTPATIENT/OBSERVATION GOALS TO BE MET BEFORE DISCHARGE:  1. ADLs back to baseline: Yes    2. Activity and level of assistance: Up with maximum assistance, baseline     3. Pain status: Pain free.    4. Return to near baseline physical activity: Yes     Discharge Planner Nurse   Safe discharge environment identified: Yes  Barriers to discharge: Yes       Entered by: Margot Pierre RN 01/28/2023    Please review provider order for any additional goals.   Nurse to notify provider when observation goals have been met and patient is ready for discharge.    Alert to self and non verbal. Total care, baseline. No IV. On RA. Bowel sounds audible. Incontinent of bowel & bladder transferred to commode & brief changed in bed. Had LG BM. Good appetite, feeder. Meds with applesauce. Plan for placement at [facility] 2/1.   BP 93/51 (BP Location: Left arm)   Pulse 61   Temp 98.6  F (37  C) (Axillary)   Resp 18   SpO2 96%

## 2023-01-28 NOTE — PLAN OF CARE
PRIMARY DIAGNOSIS: PLACMENT  OUTPATIENT/OBSERVATION GOALS TO BE MET BEFORE DISCHARGE:  1. ADLs back to baseline: Yes    2. Activity and level of assistance: Up with maximum assistance, baseline     3. Pain status: Pain free.    4. Return to near baseline physical activity: Yes     Discharge Planner Nurse   Safe discharge environment identified: Yes  Barriers to discharge: Yes       Entered by: Margot Pierre RN 01/28/2023    Please review provider order for any additional goals.   Nurse to notify provider when observation goals have been met and patient is ready for discharge.    Alert to self. Total care with lift, baseline. No IV. On RA. Bowel sounds audible. Incontinent of bowel & bladder- up to commode and had large BM this morning. Brief changed in bed. Had bm. Pt-non verbal. Good appetite, feeder. Takes meds with applesauce. Medically cleared, plan for placement Group Home 2/1. Currently resting in bed.

## 2023-01-28 NOTE — PLAN OF CARE
PRIMARY DIAGNOSIS: PLACEMENT  OUTPATIENT/OBSERVATION GOALS TO BE MET BEFORE DISCHARGE:  1. ADLs back to baseline: Yes    2. Activity and level of assistance: Up with maximum assistance. A2 lift at baseline    3. Pain status: Pain free.    4. Return to near baseline physical activity: Yes     Discharge Planner Nurse   Safe discharge environment identified: Yes  Barriers to discharge: No       Entered by: Arely Victor 01/28/2023 9:47 AM     Please review provider order for any additional goals.   Nurse to notify provider when observation goals have been met and patient is ready for discharge.    No medical condition changes or concerns. Discharge february 1st to [facility].

## 2023-01-28 NOTE — PLAN OF CARE
PRIMARY DIAGNOSIS: Placement   OUTPATIENT/OBSERVATION GOALS TO BE MET BEFORE DISCHARGE:  1. ADLs back to baseline: Yes    2. Activity and level of assistance: Up with maximum assistance, baseline     3. Pain status: Pain free.    4. Return to near baseline physical activity: Yes     Discharge Planner Nurse   Safe discharge environment identified: Yes  Barriers to discharge: Yes       Entered by: Angela Mars RN 01/27/2023    Please review provider order for any additional goals.   Nurse to notify provider when observation goals have been met and patient is ready for discharge.    Alert to self. Total care, baseline. No IV. On RA. Bowel sounds audible. Incontinent of bowel & bladder- up to commode & brief changed in bed. Had bm. Pt-non verbal. Good appetite, feeder. Takes meds with applesauce. Medically cleared, plan for placement at [facility] 2/1. Currently resting in bed.

## 2023-01-28 NOTE — PLAN OF CARE
BP 93/51 (BP Location: Left arm)   Pulse 67   Temp 98.6  F (37  C) (Axillary)   Resp 18   SpO2 97%    PRIMARY DIAGNOSIS: General Weakness  OUTPATIENT/OBSERVATION GOALS TO BE MET BEFORE DISCHARGE:  1. Pain Status: Pain free.    2. Return to near baseline physical activity: Yes    3. Cleared for discharge by consultants (if involved): Yes    Discharge Planner Nurse   Safe discharge environment identified: Yes  Barriers to discharge: Yes    Pt alert and oriented to self. Disoriented to place, situation and time. Nonverbal. Denies pain. Incontinent. A x 2 with lift. No PIV. Resting in bed. Will continue to monitor cares.          Entered by: Celeste Yancey RN 01/28/2023 1:05 AM     Please review provider order for any additional goals.   Nurse to notify provider when observation goals have been met and patient is ready for discharge.

## 2023-01-29 PROCEDURE — 250N000013 HC RX MED GY IP 250 OP 250 PS 637: Performed by: PHYSICIAN ASSISTANT

## 2023-01-29 PROCEDURE — 99231 SBSQ HOSP IP/OBS SF/LOW 25: CPT | Performed by: PHYSICIAN ASSISTANT

## 2023-01-29 PROCEDURE — G0378 HOSPITAL OBSERVATION PER HR: HCPCS

## 2023-01-29 RX ADMIN — Medication 1 TABLET: at 08:58

## 2023-01-29 RX ADMIN — GLYCERIN, PETROLATUM, PHENYLEPHRINE HCL, PRAMOXINE HCL: 144; 2.5; 10; 15 CREAM TOPICAL at 15:21

## 2023-01-29 RX ADMIN — GLYCERIN, PETROLATUM, PHENYLEPHRINE HCL, PRAMOXINE HCL: 144; 2.5; 10; 15 CREAM TOPICAL at 17:45

## 2023-01-29 RX ADMIN — LEVOTHYROXINE SODIUM 50 MCG: 0.03 TABLET ORAL at 09:39

## 2023-01-29 RX ADMIN — CYANOCOBALAMIN TAB 500 MCG 500 MCG: 500 TAB at 08:58

## 2023-01-29 RX ADMIN — ALLOPURINOL 200 MG: 100 TABLET ORAL at 08:58

## 2023-01-29 RX ADMIN — DONEPEZIL HYDROCHLORIDE 25 MG: 10 TABLET ORAL at 17:37

## 2023-01-29 RX ADMIN — GLYCERIN, PETROLATUM, PHENYLEPHRINE HCL, PRAMOXINE HCL: 144; 2.5; 10; 15 CREAM TOPICAL at 08:58

## 2023-01-29 RX ADMIN — SIMVASTATIN 40 MG: 40 TABLET, FILM COATED ORAL at 17:37

## 2023-01-29 ASSESSMENT — ACTIVITIES OF DAILY LIVING (ADL)
ADLS_ACUITY_SCORE: 55

## 2023-01-29 NOTE — PLAN OF CARE
BP 93/51 (BP Location: Left arm)   Pulse 61   Temp 98.6  F (37  C) (Axillary)   Resp 18   SpO2 96%     PRIMARY DIAGNOSIS: PLACEMENT    OUTPATIENT/OBSERVATION GOALS TO BE MET BEFORE DISCHARGE    1. Pain: Pt. Denies pain    2. Return to near baseline physical activity: Yes    3. Cleared for discharge by consultants (if involved): Yes    Discharge Planner Nurse     Safe discharge environment identified: Yes  Barriers to discharge: No    Patient is Alert and oriented to self only. Disoriented to place, time and situation. Nonverbal. LS clear. BS active and audible. Incontinent. A X 2 with Lift.  Up in chair. Family present.      Will continue with plan of care.          Entered by: Celeste Yancey RN 01/28/2023 11:48 PM     Please review provider order for any additional goals.   Nurse to notify provider when observation goals have been met and patient is ready for discharge.

## 2023-01-29 NOTE — PROGRESS NOTES
[facility]    Medicine Progress Note - Hospitalist Service    Date of Admission:  1/16/2023    Assessment & Plan   Ad Joyner is a 57 year old male with past medical history significant for Down's syndrome with cognitive impairment and memory loss, hypothyroidism, OCD, hearing loss admitted on 1/16/2023 for disposition assistance.      Legal guardians (Evelyn and Mack) have been caring for Mr. Joyner in home for the last 3 years, prior to this resided in a group home. Due to inability to care for the patient in the home, have sought assistance from the ED, hospital. Have been trying to place since end of December as an outpatient but unsuccessful so far.      #Need for disposition assistance  - no acute medical issues   - SW consulted and following  - apparently his guardians have found a new group home that can take him on on 2/1 or later  - TB test ordered per group home request      #Down's Syndrome    #Cognitive Impairment with Memory Loss, Functional decline in status  At baseline on admit - largely non verbal, used to wheelchair transfer up until the last month, requires assistance with all adl's.  Resistant to medical cares such as vital sign monitoring, lab draws, clinic visits etc.   Currently calm. Does have history of behavior disturbances described as combativeness but no issues with this for years per family.  Cognitive, functional decline since December, 2022.   - resume Donepezil  - as he is a social admission with no acute medical issues will not be drawing routine labs, vital sign monitoring etc. unless there is a change in status  - Bedside attendant at RN discretion  - on a regular diet that is easy to chew due to lack of dentition, needs assistance with all meals and fluid intake  - Family is ok to continue to help with cares ie med administration, meals   - patient should be up out of bed during the day   - per previous discussions with co - guardian, Evelyn.  Restorative, supportive only. Hospice referral for at dismissal placed and Evelyn is aware of this. He has had significant functional decline. As he is still eating, no acute medical issues, planning to hold off on formal hospice enrollment at this time.      #Macrocytic Anemia   Hgb near baseline. Does have known hemorrhoid but no significant bleeding symptoms.  - no further work up unless bleeding symptoms       Diet: Mechanical/Dental Soft Diet    DVT Prophylaxis: Pneumatic Compression Devices  Robbins Catheter: Not present  Lines: None     Cardiac Monitoring: None  Code Status: No CPR- Pre-arrest intubation OK      Clinically Significant Risk Factors Present on Admission              # Hypoalbuminemia: Lowest albumin = 2.9 g/dL at 1/16/2023 10:34 AM, will monitor as appropriate                  Disposition Plan      Expected Discharge Date: 02/01/2023    Discharge Delays: *Medically Ready for Discharge            The patient's care was discussed with the RN.    Kiersten Schofield PA-C  Hospitalist Service  [facility]  Securely message with [employer] (more info)  Text page via [employer] Paging/Directory   ______________________________________________________________________    Interval History   No acute events overnight. Sitting up in wheelchair looking at baseball cards.     Physical Exam   Vital Signs: Temp: 97.8  F (36.6  C) Temp src: Axillary BP: 97/53 Pulse: 64   Resp: 16 SpO2: 97 %      Weight: 0 lbs 0 oz    Constitutional: Awake, alert, no apparent distress  Respiratory:  Normal work of breathing.   Skin/Integument: No rashes, no cyanosis, no peripheral edema.  Neuro: Alert.  Psych: Appropriate affect.    Medical Decision Making       25 MINUTES SPENT BY ME on the date of service doing chart review, history, exam, documentation & further activities per the note.      Data   ------------------------- PAST 24 HR DATA REVIEWED -----------------------------------------------

## 2023-01-29 NOTE — PLAN OF CARE
BP 93/51 (BP Location: Left arm)   Pulse 61   Temp 98.6  F (37  C) (Axillary)   Resp 18   SpO2 96%      PRIMARY DIAGNOSIS: PLACEMENT     OUTPATIENT/OBSERVATION GOALS TO BE MET BEFORE DISCHARGE     1. Pain: Pt. Denies pain     2. Return to near baseline physical activity: Yes     3. Cleared for discharge by consultants (if involved): Yes     Discharge Planner Nurse      Safe discharge environment identified: Yes  Barriers to discharge: No     Patient is Alert and oriented to self only. Disoriented to place, time and situation. Nonverbal. LS clear. Bowel Sounds active and audible. Incontinent of both urine and BM. A X 2 with Lift. Resting in bed.      Will continue with plan of care.           Entered by: Celeste Yancey RN 01/29/2023 0442 AM     Please review provider order for any additional goals.   Nurse to notify provider when observation goals have been met and patient is ready for discharge.

## 2023-01-29 NOTE — PLAN OF CARE
PRIMARY DIAGNOSIS: PLACEMENT     OUTPATIENT/OBSERVATION GOALS TO BE MET BEFORE DISCHARGE  1. Orthostatic performed: N/A    2. Tolerating PO medications: Yes    3. Return to near baseline physical activity: Yes    4. Cleared for discharge by consultants (if involved): Yes    Discharge Planner Nurse   Safe discharge environment identified: Yes  Barriers to discharge: Yes       Entered by: Angela Mars RN 01/29/2023    Please review provider order for any additional goals.   Nurse to notify provider when observation goals have been met and patient is ready for discharge.    Patient is A&O to self. Nonverbal. Assist x2 with lift. No IV. On RA. Bowel sounds audible. Incontinent of bowel & bladder. Good appetite, feeder. Takes oral medication well, crushed in applesauce. Currently resting in bed.

## 2023-01-29 NOTE — PLAN OF CARE
PRIMARY DIAGNOSIS: PLACEMENT     OUTPATIENT/OBSERVATION GOALS TO BE MET BEFORE DISCHARGE  1. Orthostatic performed: N/A    2. Tolerating PO medications: Yes    3. Return to near baseline physical activity: Yes    4. Cleared for discharge by consultants (if involved): Yes    Discharge Planner Nurse   Safe discharge environment identified: Yes  Barriers to discharge: No       Entered by: Angela Masr RN 01/29/2023   Please review provider order for any additional goals.   Nurse to notify provider when observation goals have been met and patient is ready for discharge.    Patient is A&O to self. Nonverbal. Assist x2 with lift. No IV. On RA. Bowel sounds audible. Incontinent of bowel & bladder. Good appetite, feeder. Takes oral medication well, crushed in applesauce. Currently in bed resting. Plan for placement 2/1 at [facility] per SW.

## 2023-01-29 NOTE — PLAN OF CARE
PRIMARY DIAGNOSIS: PLACEMENT     OUTPATIENT/OBSERVATION GOALS TO BE MET BEFORE DISCHARGE  1. Orthostatic performed: N/A    2. Tolerating PO medications: Yes    3. Return to near baseline physical activity: Yes    4. Cleared for discharge by consultants (if involved): Yes    Discharge Planner Nurse   Safe discharge environment identified: Yes  Barriers to discharge: Yes       Entered by: Angela Mars RN 01/29/2023   Please review provider order for any additional goals.   Nurse to notify provider when observation goals have been met and patient is ready for discharge.    Patient is A&O to self. Nonverbal. Assist x2 with lift. No IV. On RA. Bowel sounds audible. Incontinent of bowel & bladder. Good appetite, feeder. Takes oral medication well, crushed in applesauce. Currently in wheelchair.

## 2023-01-29 NOTE — PLAN OF CARE
BP 93/51 (BP Location: Left arm)   Pulse 61   Temp 98.6  F (37  C) (Axillary)   Resp 18   SpO2 96%      PRIMARY DIAGNOSIS: PLACEMENT     OUTPATIENT/OBSERVATION GOALS TO BE MET BEFORE DISCHARGE     1. Pain: Pt. Denies pain     2. Return to near baseline physical activity: Yes     3. Cleared for discharge by consultants (if involved): Yes     Discharge Planner Nurse      Safe discharge environment identified: Yes  Barriers to discharge: No     Patient is Alert and oriented to self only. Disoriented to place, time and situation. Nonverbal. LS clear. BS active and audible. Incontinent. A X 2 with Lift. Resting in bed.      Will continue with plan of care.           Entered by: Celeste Yancey RN 01/29/2023 12:05 AM    Please review provider order for any additional goals.   Nurse to notify provider when observation goals have been met and patient is ready for discharge.

## 2023-01-30 ENCOUNTER — APPOINTMENT (OUTPATIENT)
Dept: PHYSICAL THERAPY | Facility: CLINIC | Age: 58
End: 2023-01-30
Attending: PHYSICIAN ASSISTANT
Payer: MEDICARE

## 2023-01-30 PROCEDURE — 99231 SBSQ HOSP IP/OBS SF/LOW 25: CPT | Performed by: PHYSICIAN ASSISTANT

## 2023-01-30 PROCEDURE — 999N000147 HC STATISTIC PT IP EVAL DEFER: Performed by: PHYSICAL THERAPIST

## 2023-01-30 PROCEDURE — G0378 HOSPITAL OBSERVATION PER HR: HCPCS

## 2023-01-30 PROCEDURE — 250N000013 HC RX MED GY IP 250 OP 250 PS 637: Performed by: PHYSICIAN ASSISTANT

## 2023-01-30 RX ADMIN — GLYCERIN, PETROLATUM, PHENYLEPHRINE HCL, PRAMOXINE HCL: 144; 2.5; 10; 15 CREAM TOPICAL at 17:44

## 2023-01-30 RX ADMIN — SIMVASTATIN 40 MG: 40 TABLET, FILM COATED ORAL at 17:41

## 2023-01-30 RX ADMIN — ALLOPURINOL 200 MG: 100 TABLET ORAL at 07:50

## 2023-01-30 RX ADMIN — CYANOCOBALAMIN TAB 500 MCG 500 MCG: 500 TAB at 07:50

## 2023-01-30 RX ADMIN — DONEPEZIL HYDROCHLORIDE 25 MG: 10 TABLET ORAL at 17:41

## 2023-01-30 RX ADMIN — Medication 1 TABLET: at 07:50

## 2023-01-30 RX ADMIN — LEVOTHYROXINE SODIUM 25 MCG: 0.03 TABLET ORAL at 07:53

## 2023-01-30 ASSESSMENT — ACTIVITIES OF DAILY LIVING (ADL)
ADLS_ACUITY_SCORE: 55

## 2023-01-30 NOTE — PROGRESS NOTES
"Care Management Discharge Note    Discharge Date: 02/01/2023     Discharge Disposition: [facility] through [facility]    Discharge Transportation:  Stretcher    Private pay costs discussed: transportation costs    Education Provided on the Discharge Plan:  Yes  Persons Notified of Discharge Plans: [facility], , Guardian  Patient/Family in Agreement with the Plan:  Yes    Handoff Referral Completed: No    Additional Information:   director asking that PT attempt transfers with \"stander\" device prior to discharge Wednesday. PT ordered. KELLI asked for clarification of device BRANDY has at home-awaiting clarification.     SW did confirm that  has a sandrita lift to use if/as needed, received response from director: \"We do have a sandrita lift at the home.  It just sounded like he seemed really apprehensive or uncomfortable while in the lift, and when the lift is raised, we want to look at ways we can enhance safety.  We understand if it gets to a point where he is not able to bear any weight (even with the assistance of the stander) we would use the mechanical lift so we are not causing injury to his shoulders.\"    SW faxed new WC order to [facility] 1/27 that will fit pt better than current WC he has. They will exchange WC once order processed.     SW will continue to follow for discharge to  2/1. All meds to be sent to [facility] to be filled.      Reviewed out of pocket cost for [employer] stretcher transport, $1117.00 for base rate and $26.06 per mile to the destination. Pt/family expressed understanding and are agreeable to this.      Patient requires stretcher transportation due to Down's syndrome, needing supervision    Stretcher transportation has been arranged for 11:00am 2/1.    SW will continue to follow.     1520: Facility requesting Home Care therapy be arranged for DME/Home safety eval once pt moves in. Initial referral for Home PT/OT sent to [facility], awaiting response.     LUIS M Rea, " [facility]   Inpatient Care Coordination  [facility]   [phone]

## 2023-01-30 NOTE — PROGRESS NOTES
[facility]    Medicine Progress Note - Hospitalist Service    Date of Admission:  1/16/2023    Assessment & Plan   Ad Joyner is a 57 year old male with past medical history significant for Down's syndrome with cognitive impairment and memory loss, hypothyroidism, OCD, hearing loss admitted on 1/16/2023 for disposition assistance.      Legal guardians (Evelyn and Mack) have been caring for Mr. Joyner in home for the last 3 years, prior to this resided in a group home. Due to inability to care for the patient in the home, have sought assistance from the ED, hospital. Have been trying to place since end of December as an outpatient but unsuccessful so far.      #Need for disposition assistance  - no acute medical issues   - SW consulted and following  - apparently his guardians have found a new group home that can take him on on 2/1 or later  - TB test ordered per group home request      #Down's Syndrome    #Cognitive Impairment with Memory Loss, Functional decline in status  At baseline on admit - largely non verbal, used to wheelchair transfer up until the last month, requires assistance with all adl's.  Resistant to medical cares such as vital sign monitoring, lab draws, clinic visits etc.   Currently calm. Does have history of behavior disturbances described as combativeness but no issues with this for years per family.  Cognitive, functional decline since December, 2022.   - resume Donepezil  - as he is a social admission with no acute medical issues will not be drawing routine labs, vital sign monitoring etc. unless there is a change in status  - Bedside attendant at RN discretion  - on a regular diet that is easy to chew due to lack of dentition, needs assistance with all meals and fluid intake  - Family is ok to continue to help with cares ie med administration, meals   - patient should be up out of bed during the day   - per previous discussions with co - guardian, Evelyn.  Restorative, supportive only. Hospice referral for at dismissal placed and Evelyn is aware of this. He has had significant functional decline. As he is still eating, no acute medical issues, planning to hold off on formal hospice enrollment at this time.      #Macrocytic Anemia   Hgb near baseline. Does have known hemorrhoid but no significant bleeding symptoms.  - no further work up unless bleeding symptoms         Clinically Significant Risk Factors Present on Admission              # Hypoalbuminemia: Lowest albumin = 2.9 g/dL at 1/16/2023 10:34 AM, will monitor as appropriate                  Disposition Plan      Expected Discharge Date: 02/01/2023,  3:00 PM  Discharge Delays: *Medically Ready for Discharge            The patient's care was discussed with the rn, pt, SW.    Kiersten Schofield PA-C  Hospitalist Service  [facility]  Securely message with [employer] (more info)  Text page via [facility] Paging/Directory   ______________________________________________________________________    Interval History   No acute events overnight. Sitting up in wheelchair looking at baseball cards.   Accepting GH requesting repeat PT eval for EZ stand.  Ordered.  As no change in status and intermediate cares will not plan to update family today unless something changes.    Physical Exam   Vital Signs: Temp: 97.1  F (36.2  C) Temp src: Axillary BP: 119/68 Pulse: 65   Resp: 18 SpO2: 96 % O2 Device: None (Room air)    Weight: 0 lbs 0 oz    Constitutional: Awake, alert, no apparent distress  Respiratory:  Normal work of breathing.   Cardio: RRR, normal s1,s2 no murmur.  Skin/Integument: No rashes, no cyanosis, no peripheral edema.  Neuro: Alert.  Psych: Appropriate affect.       Medical Decision Making       35 MINUTES SPENT BY ME on the date of service doing chart review, history, exam, documentation & further activities per the note.      Data   ------------------------- PAST 24 HR DATA REVIEWED  -----------------------------------------------

## 2023-01-30 NOTE — PLAN OF CARE
PRIMARY DIAGNOSIS: PLACEMENT     OUTPATIENT/OBSERVATION GOALS TO BE MET BEFORE DISCHARGE  1. Orthostatic performed: N/A    2. Tolerating PO medications: Yes    3. Return to near baseline physical activity: Yes    4. Cleared for discharge by consultants (if involved): Yes    Discharge Planner Nurse   Safe discharge environment identified: Yes  Barriers to discharge: No       Entered by: Angela Mars RN 01/29/2023   Please review provider order for any additional goals.   Nurse to notify provider when observation goals have been met and patient is ready for discharge.    Patient is A&O to self. Nonverbal. Assist x2 with lift. No IV. On RA. Bowel sounds audible. Incontinent of bowel & bladder. Good appetite, feeder. Takes oral medication well, crushed in applesauce. Currently in bed resting. Plan for placement 2/1 at [facility] per SW.

## 2023-01-30 NOTE — PLAN OF CARE
PRIMARY DIAGNOSIS: PLACEMENT  OUTPATIENT/OBSERVATION GOALS TO BE MET BEFORE DISCHARGE:  1. ADLs back to baseline: Yes    2. Activity and level of assistance: Up with maximum assistance, use ceiling lift    3. Pain status: JONO    4. Return to near baseline physical activity: Yes     Discharge Planner Nurse   Safe discharge environment identified: Yes  Barriers to discharge: No       Entered by: Sofie Lino RN 01/30/2023 12:16 PM     Please review provider order for any additional goals. Nurse to notify provider when observation goals have been met and patient is ready for discharge.    Nonverbal, can speak very little. Up with 2 assist, although WC bound. Uses a lift to get the bedside commode at times. Takes medications crushed in applesauce. Incontinent. Group home placement 2/1/23.     *Only drinks out of a red cups. Please do not throw red cup away if you see it!!! Per guardian.

## 2023-01-30 NOTE — PLAN OF CARE
PRIMARY DIAGNOSIS: Placement  OUTPATIENT/OBSERVATION GOALS TO BE MET BEFORE DISCHARGE:  1. ADLs back to baseline: Yes    2. Activity and level of assistance: Up with maximum assistance. Consider SW and/or PT evaluation.     3. Pain status: Pain free.    4. Return to near baseline physical activity: Yes     Discharge Planner Nurse   Safe discharge environment identified: Yes  Barriers to discharge: No       Entered by: Radha Krishnan RN 01/30/2023 5:21 AM     Please review provider order for any additional goals.   Nurse to notify provider when observation goals have been met and patient is ready for discharge.    Pt alert and oriented to self only. Asleep between care. Awaiting group home ([facility]) scheduled for 2/1/23.

## 2023-01-30 NOTE — PLAN OF CARE
PRIMARY DIAGNOSIS: Placement  OUTPATIENT/OBSERVATION GOALS TO BE MET BEFORE DISCHARGE:  1. ADLs back to baseline: Yes    2. Activity and level of assistance: Up with maximum assistance. Consider SW and/or PT evaluation.     3. Pain status: Pain free.    4. Return to near baseline physical activity: Yes     Discharge Planner Nurse   Safe discharge environment identified: Yes  Barriers to discharge: No       Entered by: Radha Krishnan RN 01/30/2023 5:19 AM     Please review provider order for any additional goals.   Nurse to notify provider when observation goals have been met and patient is ready for discharge.    Pt alert and oriented to self only. Denies any pain.

## 2023-01-30 NOTE — PLAN OF CARE
PT: New orders received for GH request to trial stander machine; utilized hospital option of power sit to stand mini-lift. Pt resistant to placement of torso sling, attachment of sling to device. Resistance/agitation increased during attempt of stand with mechanical power lift. Per SW, facility has capability to use sandrita lift. This PT reinforces prior recommendation for use of sandrita lift versus power stander at this time due to safety concerns with patient engagement in performing mobility. Will complete orders.

## 2023-01-30 NOTE — PLAN OF CARE
PRIMARY DIAGNOSIS: PLACEMENT  OUTPATIENT/OBSERVATION GOALS TO BE MET BEFORE DISCHARGE:  ADLs back to baseline: Yes    Activity and level of assistance: Up with maximum assistance. Consider SW and/or PT evaluation.     Pain status: JONO    Return to near baseline physical activity: Yes     Discharge Planner Nurse   Safe discharge environment identified: Yes  Barriers to discharge: No       Entered by: Sofie Lino RN 01/30/2023 9:05 AM     Please review provider order for any additional goals. Nurse to notify provider when observation goals have been met and patient is ready for discharge.    Nonverbal. Up with 2 assist, although WC bound. Takes medications crushed in applesauce. Incontinent. Group home placement 2/1/23.

## 2023-01-31 PROCEDURE — 250N000013 HC RX MED GY IP 250 OP 250 PS 637: Performed by: PHYSICIAN ASSISTANT

## 2023-01-31 PROCEDURE — G0378 HOSPITAL OBSERVATION PER HR: HCPCS

## 2023-01-31 PROCEDURE — 99231 SBSQ HOSP IP/OBS SF/LOW 25: CPT | Performed by: PHYSICIAN ASSISTANT

## 2023-01-31 RX ADMIN — CYANOCOBALAMIN TAB 500 MCG 500 MCG: 500 TAB at 10:22

## 2023-01-31 RX ADMIN — LEVOTHYROXINE SODIUM 25 MCG: 0.03 TABLET ORAL at 10:21

## 2023-01-31 RX ADMIN — ALLOPURINOL 200 MG: 100 TABLET ORAL at 10:21

## 2023-01-31 RX ADMIN — SIMVASTATIN 40 MG: 40 TABLET, FILM COATED ORAL at 17:18

## 2023-01-31 RX ADMIN — GLYCERIN, PETROLATUM, PHENYLEPHRINE HCL, PRAMOXINE HCL: 144; 2.5; 10; 15 CREAM TOPICAL at 10:21

## 2023-01-31 RX ADMIN — GLYCERIN, PETROLATUM, PHENYLEPHRINE HCL, PRAMOXINE HCL: 144; 2.5; 10; 15 CREAM TOPICAL at 14:11

## 2023-01-31 RX ADMIN — GLYCERIN, PETROLATUM, PHENYLEPHRINE HCL, PRAMOXINE HCL: 144; 2.5; 10; 15 CREAM TOPICAL at 17:18

## 2023-01-31 RX ADMIN — Medication 1 TABLET: at 10:21

## 2023-01-31 RX ADMIN — DONEPEZIL HYDROCHLORIDE 25 MG: 10 TABLET ORAL at 17:18

## 2023-01-31 ASSESSMENT — ACTIVITIES OF DAILY LIVING (ADL)
ADLS_ACUITY_SCORE: 55

## 2023-01-31 NOTE — PLAN OF CARE
PRIMARY DIAGNOSIS: PLACEMENT  OUTPATIENT/OBSERVATION GOALS TO BE MET BEFORE DISCHARGE:  1. ADLs back to baseline: Yes     2. Activity and level of assistance: Up with maximum assistance, use ceiling lift     3. Pain status: None     4. Return to near baseline physical activity: Yes          Discharge Planner Nurse   Safe discharge environment identified: Yes  Barriers to discharge: No  Entered by: Kristi Sheikh RN 01/30/2023   Pt alert to self, nonverbal, says words like yes/no. Maximum assist of 2 with all cares and transfers using a ceiling lift. Medications taken crushed in applesauce. Assisted with feeding for dinner, consumed 75 % of his food. He is on mechanical soft diet. Plan: For Group home placement on 02/01/23.    Please review provider order for any additional goals. Nurse to notify provider when observation goals have been met and patient is ready for discharge.     *Only drinks out of a red cups. Please do not throw red cup away if you see it!!! Per guardian.

## 2023-01-31 NOTE — PROGRESS NOTES
[facility]    Medicine Progress Note - Hospitalist Service    Date of Admission:  1/16/2023    Assessment & Plan   Ad Joyner is a 57 year old male with past medical history significant for Down's syndrome with cognitive impairment and memory loss, hypothyroidism, OCD, hearing loss admitted on 1/16/2023 for disposition assistance.      Legal guardians (Evelyn and Mack) have been caring for Mr. Joyner in home for the last 3 years, prior to this resided in a group home. Due to inability to care for the patient in the home, have sought assistance from the ED, hospital. Have been trying to place since end of December as an outpatient but unsuccessful so far.      #Need for disposition assistance  - no acute medical issues   - SW consulted and following  - apparently his guardians have found a new group home that can take him on on 2/1 or later  - TB test ordered per group home request      #Down's Syndrome    #Cognitive Impairment with Memory Loss, Functional decline in status  At baseline on admit - largely non verbal, used to wheelchair transfer up until the last month, requires assistance with all adl's.  Resistant to medical cares such as vital sign monitoring, lab draws, clinic visits etc.   Currently calm. Does have history of behavior disturbances described as combativeness but no issues with this for years per family.  Cognitive, functional decline since December, 2022.   - resume Donepezil  - as he is a social admission with no acute medical issues will not be drawing routine labs, vital sign monitoring etc. unless there is a change in status  - Bedside attendant at RN discretion  - on a regular diet that is easy to chew due to lack of dentition, needs assistance with all meals and fluid intake  - Family is ok to continue to help with cares ie med administration, meals   - patient should be up out of bed during the day   - per previous discussions with co - guardian, Evelyn.  Restorative, supportive only. Hospice referral for at dismissal placed and Evelyn is aware of this. He has had significant functional decline. As he is still eating, no acute medical issues, planning to hold off on formal hospice enrollment at this time.      #Macrocytic Anemia   Hgb near baseline. Does have known hemorrhoid but no significant bleeding symptoms.  - no further work up unless bleeding symptoms         Diet: Mechanical/Dental Soft Diet  Diet    DVT Prophylaxis: Pneumatic Compression Devices  Robbins Catheter: Not present  Lines: None     Cardiac Monitoring: None  Code Status: No CPR- Pre-arrest intubation OK      Clinically Significant Risk Factors Present on Admission              # Hypoalbuminemia: Lowest albumin = 2.9 g/dL at 1/16/2023 10:34 AM, will monitor as appropriate                  Disposition Plan      Expected Discharge Date: 02/01/2023    Discharge Delays: *Medically Ready for Discharge            The patient's care was discussed with the Bedside Nurse and Care Coordinator/.    Kiersten Schofield PA-C  Hospitalist Service  [facility]  Securely message with [employer] (more info)  Text page via [employer] Paging/Directory   ______________________________________________________________________    Interval History   No acute events overnight.  Anticipated discharge tomorrow to group home.  Orders completed.  Guardian aware.  Tolerating soft diet.      Physical Exam   Vital Signs: Temp: 97.9  F (36.6  C) Temp src: Oral BP: 98/65 Pulse: 64   Resp: 20 SpO2: 94 % O2 Device: None (Room air)    Weight: 0 lbs 0 oz    Constitutional: Awake, alert, no apparent distress  Respiratory:  Normal work of breathing.   Cardio: RRR, normal s1,s2 no murmur.  Skin/Integument: No rashes, no cyanosis, no peripheral edema.  Neuro: Alert.  Psych: Appropriate affect.       Medical Decision Making       25 MINUTES SPENT BY ME on the date of service doing chart review, history, exam,  documentation & further activities per the note.      Data   ------------------------- PAST 24 HR DATA REVIEWED -----------------------------------------------

## 2023-01-31 NOTE — PLAN OF CARE
PRIMARY DIAGNOSIS: GENERALIZED WEAKNESS, FTT    OUTPATIENT/OBSERVATION GOALS TO BE MET BEFORE DISCHARGE  1. Orthostatic performed: No    2. Tolerating PO medications: Yes    3. Return to near baseline physical activity: Yes    4. Cleared for discharge by consultants (if involved): Yes    Discharge Planner Nurse   Safe discharge environment identified: Yes  Barriers to discharge: No       Entered by: Chelly Yin RN 01/31/2023  Pt alert to self only, nonverbal at baseline. LS diminished, BS active. Pt up with A2 and lift device. No PIV in place. Incontinent of B&B. Pt is an assist with meals, tolerating mechanical soft diet. No indication of pain noted. Plan for pt to discharge on 2/1 at 1100.  Please review provider order for any additional goals.   Nurse to notify provider when observation goals have been met and patient is ready for discharge.

## 2023-01-31 NOTE — PROGRESS NOTES
Care Management Discharge Note    Discharge Date: 02/01/2023     Discharge Disposition: [facility]    Discharge Services:  HC RN/PT/OT    Discharge DME:  [facility] ( has)    Discharge Transportation:  Stretcher 11am 2/1    Private pay costs discussed: transportation costs    Education Provided on the Discharge Plan:  Yes  Persons Notified of Discharge Plans: Guardian,  staff  Patient/Family in Agreement with the Plan: Yes     Handoff Referral Completed: No    Additional Information:  Pt scheduled for discharge tomorrow, 2/1, to [facility]: [address] [facility] [address]. Orders to be faxed to  at (f) [phone]. Meds to be sent to [facility] to be filled.     Notified by [employer] ([phone], (f) [phone]) that pt is open to HC RN/PT/OT services. SW updated on pt's new address and discharge date. They are requesting new orders at discharge.     SW will continue to follow.     LUIS M Rea, [facility]   Inpatient Care Coordination  [facility]   [phone]

## 2023-01-31 NOTE — PLAN OF CARE
PRIMARY DIAGNOSIS: PLACEMENT     OUTPATIENT/OBSERVATION GOALS TO BE MET BEFORE DISCHARGE  1. Orthostatic performed: N/A    2. Tolerating PO medications: Yes    3. Return to near baseline physical activity: Yes    4. Cleared for discharge by consultants (if involved): Yes    Discharge Planner Nurse   Safe discharge environment identified: Yes  Barriers to discharge: No       Entered by: Angela Mars RN 01/31/2023   Please review provider order for any additional goals.   Nurse to notify provider when observation goals have been met and patient is ready for discharge.    Patient is A&O to self. Nonverbal. Assist x2 with lift. No IV. On RA. Bowel sounds audible. Incontinent of bowel (had 2 bms)& bladder. Good appetite, feeder. Takes oral medication well, crushed in applesauce. Currently up in wheelchair. Plan for placement 2/1 at [facility] per SW.

## 2023-01-31 NOTE — PLAN OF CARE
PRIMARY DIAGNOSIS: PLACEMENT  OUTPATIENT/OBSERVATION GOALS TO BE MET BEFORE DISCHARGE:  1. ADLs back to baseline: Yes     2. Activity and level of assistance: Up with maximum assistance, use ceiling lift     3. Pain status: None     4. Return to near baseline physical activity: Yes          Discharge Planner Nurse   Safe discharge environment identified: Yes  Barriers to discharge: No  Entered by: Kristi Sheikh RN 01/30/2023     Pt alert to self, nonverbal, says words like yes/no. Maximum assist of 2 with all cares and transfers using a ceiling lift. Medications taken crushed in applesauce. Assisted with feeding for dinner, consumed 75 % of his food. He is on mechanical soft diet. Family/guardian visited. Plan: For Group home placement on 02/01/23.     Please review provider order for any additional goals. Nurse to notify provider when observation goals have been met and patient is ready for discharge.     *Only drinks out of a red cups. Please do not throw red cup away if you see it!!! Per guardian.

## 2023-01-31 NOTE — PLAN OF CARE
PRIMARY DIAGNOSIS: PLACEMENT     OUTPATIENT/OBSERVATION GOALS TO BE MET BEFORE DISCHARGE  1. Orthostatic performed: N/A    2. Tolerating PO medications: Yes    3. Return to near baseline physical activity: Yes    4. Cleared for discharge by consultants (if involved): Yes    Discharge Planner Nurse   Safe discharge environment identified: Yes  Barriers to discharge: No       Entered by: Angela Mars RN 01/31/2023   Please review provider order for any additional goals.   Nurse to notify provider when observation goals have been met and patient is ready for discharge.    Patient is A&O to self. Nonverbal. Assist x2 with lift. No IV. On RA. Bowel sounds audible. Incontinent of bowel & bladder. Good appetite, feeder. Takes oral medication well, crushed in applesauce. Currently in bed resting. Plan for placement 2/1 at [facility] per SW.

## 2023-02-01 VITALS
OXYGEN SATURATION: 98 % | SYSTOLIC BLOOD PRESSURE: 114 MMHG | HEART RATE: 76 BPM | RESPIRATION RATE: 18 BRPM | DIASTOLIC BLOOD PRESSURE: 69 MMHG | TEMPERATURE: 97.5 F

## 2023-02-01 PROCEDURE — G0378 HOSPITAL OBSERVATION PER HR: HCPCS

## 2023-02-01 PROCEDURE — 250N000013 HC RX MED GY IP 250 OP 250 PS 637: Performed by: PHYSICIAN ASSISTANT

## 2023-02-01 PROCEDURE — 99238 HOSP IP/OBS DSCHRG MGMT 30/<: CPT | Performed by: PHYSICIAN ASSISTANT

## 2023-02-01 RX ADMIN — Medication 1 TABLET: at 09:32

## 2023-02-01 RX ADMIN — ALLOPURINOL 200 MG: 100 TABLET ORAL at 09:32

## 2023-02-01 RX ADMIN — LEVOTHYROXINE SODIUM 50 MCG: 0.03 TABLET ORAL at 09:32

## 2023-02-01 RX ADMIN — GLYCERIN, PETROLATUM, PHENYLEPHRINE HCL, PRAMOXINE HCL: 144; 2.5; 10; 15 CREAM TOPICAL at 09:36

## 2023-02-01 RX ADMIN — CYANOCOBALAMIN TAB 500 MCG 500 MCG: 500 TAB at 09:32

## 2023-02-01 ASSESSMENT — ACTIVITIES OF DAILY LIVING (ADL)
ADLS_ACUITY_SCORE: 55

## 2023-02-01 NOTE — PROGRESS NOTES
BP (!) 89/68 (BP Location: Left arm)   Pulse 73   Temp 97  F (36.1  C) (Axillary)   Resp 20   SpO2 97%     Reported to Marilin RAMIREZ Will recheck in 1 hr.

## 2023-02-01 NOTE — PLAN OF CARE
PRIMARY DIAGNOSIS: Placement  OUTPATIENT/OBSERVATION GOALS TO BE MET BEFORE DISCHARGE:  ADLs back to baseline: Yes    Activity and level of assistance: Up with maximum assistance. Consider SW and/or PT evaluation.     Pain status: Pain free.    Return to near baseline physical activity: Yes     Discharge Planner Nurse   Safe discharge environment identified: Yes  Barriers to discharge: No       Entered by: Laron Rodriguez RN 01/31/2023 10:57 PM     Please review provider order for any additional goals.   Nurse to notify provider when observation goals have been met and patient is ready for discharge.

## 2023-02-01 NOTE — PLAN OF CARE
PRIMARY DIAGNOSIS: PLACEMENT     OUTPATIENT/OBSERVATION GOALS TO BE MET BEFORE DISCHARGE  1. Orthostatic performed: N/A    2. Tolerating PO medications: Yes    3. Return to near baseline physical activity: Yes    4. Cleared for discharge by consultants (if involved): Yes    Discharge Planner Nurse   Safe discharge environment identified: Yes  Barriers to discharge: No       Entered by: Angela Mars RN 01/31/2023   Please review provider order for any additional goals.   Nurse to notify provider when observation goals have been met and patient is ready for discharge.    Patient is A&O to self. Nonverbal. Assist x2 with lift. No IV. On RA. Bowel sounds audible. Incontinent of bowel (had 2 bms)& bladder. Fair appetite, feeder. Takes oral medication well, crushed in applesauce. Currently up in wheelchair. Plan for placement 2/1 at [facility] per SW.

## 2023-02-01 NOTE — PLAN OF CARE
PRIMARY DIAGNOSIS: GENERALIZED WEAKNESS, FTT, PLACEMENT    OUTPATIENT/OBSERVATION GOALS TO BE MET BEFORE DISCHARGE  1. Orthostatic performed: No    2. Tolerating PO medications: Yes    3. Return to near baseline physical activity: Yes    4. Cleared for discharge by consultants (if involved): Yes    Discharge Planner Nurse   Safe discharge environment identified: Yes  Barriers to discharge: No       Entered by: Chelly Yin RN 02/01/2023  Pt alert to self only, nonverbal at baseline. LS diminished, BS active. Pt up with A2 and lift device. No PIV in place. Incontinent of B&B. Pt is an assist with meals, tolerating mechanical soft diet. No indication of pain noted. Plan for pt to discharge on 2/1 at 1100.  Please review provider order for any additional goals.   Nurse to notify provider when observation goals have been met and patient is ready for discharge.

## 2023-02-01 NOTE — PLAN OF CARE
Patient's After Visit Summary was reviewed with patient   Patient verbalized understanding of After Visit Summary, recommended follow up and was given an opportunity to ask questions.   Discharge medications sent home with patient/family:  No   Discharged with transport tech    Patient is alert to self. No IV. VSS. Denies pain. Discharged via stretcher, pt's wheelchair went with pt. Belongings sent with pt.      /69 (BP Location: Left arm)   Pulse 76   Temp 97.5  F (36.4  C) (Axillary)   Resp 18   SpO2 98%

## 2023-02-01 NOTE — PLAN OF CARE
PRIMARY DIAGNOSIS: PLACEMENT     OUTPATIENT/OBSERVATION GOALS TO BE MET BEFORE DISCHARGE  1. Orthostatic performed: N/A    2. Tolerating PO medications: Yes    3. Return to near baseline physical activity: Yes    4. Cleared for discharge by consultants (if involved): Yes    Discharge Planner Nurse   Safe discharge environment identified: Yes  Barriers to discharge: No       Entered by: Angela Mars RN 02/01/2023   Please review provider order for any additional goals.   Nurse to notify provider when observation goals have been met and patient is ready for discharge.    Patient is A&O to self. Nonverbal. Assist x2 with lift. No IV. On RA. Bowel sounds audible. Incontinent of bowel (had 1 bm)& bladder. Fair appetite, feeder. Takes oral medication well, crushed in applesauce. Currently up in wheelchair. Plan for placement 2/1 at [facility] at 11AM per SW.

## 2023-02-01 NOTE — DISCHARGE SUMMARY
[facility] Discharge Summary          Ad Joyner MRN# [id number]   Age: 57 year old YOB: 1965     Date of Admission:  1/16/2023  Date of Discharge::  2/1/2023  Admitting Physician:  Jame Garcia DO  Discharge Physician:  Marilin Kim PA-C  Primary Physician: No Ref-Primary, Physician     Primary Discharge Diagnoses:   Need for disposition assistance        Hospital Course:   For detail history, please refer to H & P from 1/16/2023. In brief, this is a 57 year old male with past medical history significant for Down's syndrome with cognitive impairment and memory loss, hypothyroidism, OCD, hearing loss admitted on 1/16/2023 for disposition assistance.      Legal guardians (Evelyn and Mack) have been caring for Mr. Joyner in home for the last 3 years, prior to this resided in a group home. Due to inability to care for the patient in the home, have sought assistance from the ED, hospital. Have been trying to place since end of December as an outpatient but unsuccessful pta.      #Need for disposition assistance  #Down's Syndrome    #Cognitive Impairment with Memory Loss, Functional decline in status  At baseline on admit - largely non verbal, used to wheelchair transfer up until the last month, requires assistance with all adl's.  Resistant to medical cares such as vital sign monitoring, lab draws, clinic visits etc. Does have history of behavior disturbances described as combativeness but no issues with this for years per family.  Cognitive, functional decline since December, 2022.   - no acute medical issues this hospital stay   - SW consulted and patient was discharged to [facility]    Procedures/Imaging:     Results for orders placed or performed during the hospital encounter of 11/25/15   CT Abdomen Pelvis w Contrast    Addendum: 12/10/2015    Ad Joyner   Accession # [id number]    The original report on this patient was dictated by Spenser boggs M.D..       Outside CT chest 8/17/15 available for comparison.    The infiltrate in the right lung base seen on 11/26/15 was not present  on 8/17/15 and may be secondary to pneumonia. The fluid stranding  about the gallbladder and inferior margin of the liver seen on  11/26/15 was not present on 8/17/15. The pulmonary cyst in the left  lower lobe is unchanged.    Ravin Xie M.D. ( Date of Addendum: 12/10/15 )    RAVIN XIE MD      Narrative    CT ABDOMEN PELVIS W CONTRAST  11/26/2015 12:52 AM      HISTORY: Right abdominal pain.    TECHNIQUE: CT abdomen and pelvis with intravenous contrast. 75 mL  [id number].     COMPARISON: None.    FINDINGS:    Abdomen: There is right basilar infiltrate which is likely pneumonia.  There is a pulmonary cyst in the left lower lobe. The heart size is  normal. The liver, spleen, pancreas, adrenal glands and right kidney  are normal in appearance. There is cortical scarring in the left  kidney. There may be mild fluid stranding about the gallbladder and  the inferior margin of the liver. No calcified gallstones. There is no  abdominal or pelvic lymph node enlargement.    Pelvis: Bowel appears normal without obstruction or inflammation. The  appendix is not seen. No evidence of appendicitis. No free  intraperitoneal gas or fluid. Mild degenerative disease in the spine.      Impression    IMPRESSION:  1. There is a right basilar infiltrate which may be pneumonia.   2. Mild fluid stranding in the fat about the gallbladder and inferior  margin of the liver. There are no calcified gallstones. If there is  clinical concern for biliary disease, ultrasound is recommended.  3. No bowel obstruction or inflammation.    ANSLEY HALEY MD   US Abdomen Limited    Narrative    US ABDOMEN LIMITED  11/26/2015 2:47 AM      HISTORY: Right upper quadrant pain. Right upper quadrant inflammation  on CT.     COMPARISON: CT 11/26/2015.    FINDINGS: The liver is normal in size and texture without focal  mass.  There is no intra- or extrahepatic biliary dilatation. The common  hepatic duct measures 0.4 cm. The gallbladder is normal in appearance  without gallstones. No gallbladder wall thickening. No pericholecystic  fluid. The pancreas is nearly completely obscured by bowel gas.  The  right kidney measures 9.8 cm and is normal in appearance. The proximal  abdominal aorta and IVC appear normal.       Impression    IMPRESSION: Normal right upper quadrant. No gallstone or biliary  dilatation.    ANSLEY HALEY MD   XR Chest 1 View    Narrative    XR CHEST 1 VW  11/26/2015 2:22 AM      HISTORY: Chest and abdominal pain.     COMPARISON: None.    FINDINGS: The heart is enlarged. There is pulmonary vascular  congestion but not definite pulmonary edema. There is a right basilar  infiltrate, atelectasis versus pneumonia. Left lung appears clear.      Impression    IMPRESSION:  1. Cardiomegaly and pulmonary vascular congestion.   2. Right basilar infiltrate.    ANSLEY HALEY MD     Allergies:   No Known Allergies     Subjective:   No acute events overnight.  Pt alert and calm.    Physical Exam:   Blood pressure 114/69, pulse 76, temperature 97.5  F (36.4  C), temperature source Axillary, resp. rate 18, SpO2 98 %.   General: Alert, awake, no distress   HEENT: AT/NC  Resp: clear to auscultation bilaterally, no crackles or wheezes  Cardiac: regular rate and rhythm,  Abdomen: Soft  Neuro: Alert   Discharge Medicatios:        Discharge Medication List as of 2/1/2023 10:18 AM      START taking these medications    Details   pramox-pe-glycerin-petrolatum (PREPARATION H) 1-0.25-14.4-15 % CREA cream Place rectally 3 times dailyDisp-5.7 g, R-0No Print Out         CONTINUE these medications which have NOT CHANGED    Details   allopurinol (ZYLOPRIM) 100 MG tablet Take 2 tablets by mouth daily., Historical      Calcium Carb-Cholecalciferol (CALCIUM 500 +D PO) Take 1 tablet by mouth daily., Historical      carbamide peroxide (DEBROX)  6.5 % otic solution Place 5 drops in ear(s) 2 times daily 1st 7 days of each month, Historical      cyanocolbalamin (VITAMIN  B-12) 500 MCG tablet Take 500 mcg by mouth daily., Historical      !! donepezil (ARICEPT) 10 MG tablet Take 20 mg by mouth At Bedtime Take with 5mg tablet for a total of 25mg daily, Historical      !! donepezil (ARICEPT) 5 MG tablet Take 5 mg by mouth At Bedtime Take with two 10mg tablets for a total of 25mg daily, Historical      levothyroxine (SYNTHROID/LEVOTHROID) 25 MCG tablet Take 25-50 mcg by mouth daily Take two 25mcg tablets on Saturday Sunday and Wednesday. Take one 25mcg tablet all other days, Historical      multivitamins with minerals (CERTAVITE) LIQD Take 15 mLs by mouth three times a week, Historical      simvastatin (ZOCOR) 80 MG tablet Take 40 mg by mouth daily, Historical       !! - Potential duplicate medications found. Please discuss with provider.      STOP taking these medications       cholecalciferol 50 MCG (2000 UT) tablet Comments:   Reason for Stopping:         Halobetasol-Ammonium Lactate 0.05 & 12 % (CREAM) KIT Comments:   Reason for Stopping:         MINERAL OIL Comments:   Reason for Stopping:               Instructions Given to Patient as Discharge:     Discharge Procedure Orders   Home Care Referral   Referral Priority: Routine: Next available opening Referral Type: Home Health Therapies & Aides   Number of Visits Requested: 1     Reason for your hospital stay   Order Comments: Disposition assistance     Follow-up and recommended labs and tests    Order Comments: Follow up with primary care provider, as previously directed     Activity   Order Comments: Your activity upon discharge: activity as tolerated using ez stand, wheelchair     Order Specific Question Answer Comments   Is discharge order? Yes      Discharge Instructions     When to contact your care team   Order Comments: Call your primary care doctor if you have any of the following: temperature greater  "than 101 F, worsening shortness of breath, increased swelling, worsening pain, new or unrelenting diarrhea, or any other concerning symptoms. Call 911 or go to the emergency room if you need immediate assistance.     Wheelchair   Order Comments: Wheelchair Documentation:   Describe the reason for need to support medical necessity: downs syndrome .   1. The patient has mobility limitations that impairs their ability to participate in one or more mobility related activities: Toileting, Feeding, Grooming, and Bathing.  2. The patient's mobility limitations cannot be safely resolved by using a cane/walker: Yes  3. The patients home has adequate access to use a manual wheelchair: Yes  4. The use of a manual wheelchair on a regular basis will improve the patients ability to participate in mobility related ADL's at home: Yes  5. The patient is willing to use a manual wheelchair at home: Yes  6. The patient has adequate upper body strength and the mental capability to safely use a manual wheelchair and/or has a caregiver that is able to assist: Yes  7. Does the patient have a lower extremity injury or edema?: No    Reason for Type of Wheelchair: Pt will need a pediatric wheelchair. Pt is 4'9\" and 130 lbs, He will need a wheelchair with TRAY and ADJUSTABLE LEGS.   **Use of a manual wheelchair will significantly improve the patient's ability to participate in MRADLs and the patient will use it on a regular basis in the home. The patient has not expressed an unwillingness to use the manual wheelchair that is provided in the home.**    I, the undersigned, certify that the above prescribed supplies are medically necessary for this patient and is both reasonable and necessary in reference to accepted standards of medical and necessary in reference to accepted standards of medical practice in the treatment of this patient's condition and is not prescribed as a convenience.     Order Specific Question Answer Comments   Start Date: " 1/27/2023    Wheelchair Type: Other (Comments) Pediatric   Length of Need: Lifetime    Leg Rests: Elevating    Arm Rests: Standard    The face to face evaluation was performed on: 1/27/2023      Diet   Order Comments: Follow this diet upon discharge: Orders Placed This Encounter      Mechanical/Dental Soft Diet - easy to chew due to lack of dentition, needs assistance with all meals and fluid intake     Order Specific Question Answer Comments   Is discharge order? Yes        Pending Tests at Discharge:   none    Discharge Disposition:     Discharged to Group Home     Marilin CANTUC  Hospitalist Service  Pager [phone]    <30 minutes was spent in discharge planning, care coordination, physical examination and medication reconciliation on the date of discharge, 2/1/2023

## 2024-10-11 ENCOUNTER — LAB REQUISITION (OUTPATIENT)
Dept: LAB | Facility: CLINIC | Age: 59
End: 2024-10-11
Payer: MEDICARE

## 2024-10-11 DIAGNOSIS — E03.9 HYPOTHYROIDISM, UNSPECIFIED: ICD-10-CM

## 2024-10-11 LAB
T4 FREE SERPL-MCNC: 1.2 NG/DL (ref 0.9–1.7)
TSH SERPL DL<=0.005 MIU/L-ACNC: 4.04 UIU/ML (ref 0.3–4.2)

## 2024-10-11 PROCEDURE — 84481 FREE ASSAY (FT-3): CPT | Mod: ORL | Performed by: NURSE PRACTITIONER

## 2024-10-11 PROCEDURE — 84443 ASSAY THYROID STIM HORMONE: CPT | Mod: ORL | Performed by: NURSE PRACTITIONER

## 2024-10-11 PROCEDURE — 84439 ASSAY OF FREE THYROXINE: CPT | Mod: ORL | Performed by: NURSE PRACTITIONER

## 2024-10-12 LAB — T3FREE SERPL-MCNC: 2 PG/ML (ref 2–4.4)

## 2024-12-16 NOTE — PLAN OF CARE
PRIMARY DIAGNOSIS: PLACEMENT  OUTPATIENT/OBSERVATION GOALS TO BE MET BEFORE DISCHARGE:  1. ADLs back to baseline: Yes, total assist at baseline    2. Activity and level of assistance: Total assist    3. Pain status: Pain free.    4. Return to near baseline physical activity: Yes     Discharge Planner Nurse   Safe discharge environment identified: No  Barriers to discharge: Yes       Entered by: Janet Horan RN 01/26/2023 11:24 AM    Only oriented to self. Total assist at baseline. Incontinent of B/B, changed pt x2 this shift. Pt non-verbal. Feeder, ate ~50% of meal. Family in the room visiting. Medications given per orders. Refused vitals.  Please review provider order for any additional goals.   Nurse to notify provider when observation goals have been met and patient is ready for discharge.     Diet, Regular:   Kosher (12-15-24 @ 10:33)

## 2025-03-06 NOTE — PLAN OF CARE
PRIMARY DIAGNOSIS: PLACEMENT  OUTPATIENT/OBSERVATION GOALS TO BE MET BEFORE DISCHARGE:  1. ADLs back to baseline: Yes, total assist at baseline    2. Activity and level of assistance: Total assist    3. Pain status: Pain free.    4. Return to near baseline physical activity: Yes     Discharge Planner Nurse   Safe discharge environment identified: No  Barriers to discharge: Yes       Entered by: Janet Horan RN 01/26/2023 1:01 PM    Only oriented to self. Total assist at baseline. Incontinent of B/B, changed pt x2 this shift. Pt non-verbal. Feeder, ate ~50% of meal. Family in the room visiting. Medications given per orders. Refused vitals.  SW: [facility] on 2/1 at 11am -- ride needs to be setup.  Please review provider order for any additional goals.   Nurse to notify provider when observation goals have been met and patient is ready for discharge.     appears normal and intact